# Patient Record
Sex: MALE | Race: WHITE
[De-identification: names, ages, dates, MRNs, and addresses within clinical notes are randomized per-mention and may not be internally consistent; named-entity substitution may affect disease eponyms.]

---

## 2017-08-09 ENCOUNTER — HOSPITAL ENCOUNTER (EMERGENCY)
Dept: HOSPITAL 17 - PHED | Age: 82
Discharge: HOME | End: 2017-08-09
Payer: MEDICARE

## 2017-08-09 VITALS
HEART RATE: 89 BPM | RESPIRATION RATE: 22 BRPM | SYSTOLIC BLOOD PRESSURE: 181 MMHG | DIASTOLIC BLOOD PRESSURE: 84 MMHG | OXYGEN SATURATION: 97 % | TEMPERATURE: 98.3 F

## 2017-08-09 VITALS — OXYGEN SATURATION: 100 % | RESPIRATION RATE: 22 BRPM | HEART RATE: 84 BPM

## 2017-08-09 VITALS — WEIGHT: 154.32 LBS | HEIGHT: 68 IN | BODY MASS INDEX: 23.39 KG/M2

## 2017-08-09 DIAGNOSIS — Z87.09: ICD-10-CM

## 2017-08-09 DIAGNOSIS — Z86.79: ICD-10-CM

## 2017-08-09 DIAGNOSIS — Z99.81: ICD-10-CM

## 2017-08-09 DIAGNOSIS — N19: ICD-10-CM

## 2017-08-09 DIAGNOSIS — J44.1: Primary | ICD-10-CM

## 2017-08-09 DIAGNOSIS — E78.00: ICD-10-CM

## 2017-08-09 DIAGNOSIS — Z87.19: ICD-10-CM

## 2017-08-09 DIAGNOSIS — Z86.69: ICD-10-CM

## 2017-08-09 DIAGNOSIS — I10: ICD-10-CM

## 2017-08-09 DIAGNOSIS — R94.31: ICD-10-CM

## 2017-08-09 LAB
ANION GAP SERPL CALC-SCNC: 11 MEQ/L (ref 5–15)
BASOPHILS # BLD AUTO: 0.1 TH/MM3 (ref 0–0.2)
BASOPHILS NFR BLD: 1 % (ref 0–2)
BUN SERPL-MCNC: 29 MG/DL (ref 7–18)
CHLORIDE SERPL-SCNC: 103 MEQ/L (ref 98–107)
EOSINOPHIL # BLD: 0.2 TH/MM3 (ref 0–0.4)
EOSINOPHIL NFR BLD: 3.1 % (ref 0–4)
ERYTHROCYTE [DISTWIDTH] IN BLOOD BY AUTOMATED COUNT: 13.9 % (ref 11.6–17.2)
GFR SERPLBLD BASED ON 1.73 SQ M-ARVRAT: 34 ML/MIN (ref 89–?)
HCO3 BLD-SCNC: 24.5 MEQ/L (ref 21–32)
HCT VFR BLD CALC: 40.9 % (ref 39–51)
HEMO FLAGS: (no result)
LYMPHOCYTES # BLD AUTO: 0.8 TH/MM3 (ref 1–4.8)
LYMPHOCYTES NFR BLD AUTO: 12.5 % (ref 9–44)
MCH RBC QN AUTO: 28.5 PG (ref 27–34)
MCHC RBC AUTO-ENTMCNC: 32 % (ref 32–36)
MCV RBC AUTO: 89.2 FL (ref 80–100)
MONOCYTES NFR BLD: 9.5 % (ref 0–8)
NEUTROPHILS # BLD AUTO: 4.9 TH/MM3 (ref 1.8–7.7)
NEUTROPHILS NFR BLD AUTO: 73.9 % (ref 16–70)
PLATELET # BLD: 140 TH/MM3 (ref 150–450)
POTASSIUM SERPL-SCNC: 4.4 MEQ/L (ref 3.5–5.1)
RBC # BLD AUTO: 4.59 MIL/MM3 (ref 4.5–5.9)
SCAN/DIFF: (no result)
SODIUM SERPL-SCNC: 138 MEQ/L (ref 136–145)
WBC # BLD AUTO: 6.6 TH/MM3 (ref 4–11)

## 2017-08-09 PROCEDURE — 94664 DEMO&/EVAL PT USE INHALER: CPT

## 2017-08-09 PROCEDURE — 83880 ASSAY OF NATRIURETIC PEPTIDE: CPT

## 2017-08-09 PROCEDURE — 96374 THER/PROPH/DIAG INJ IV PUSH: CPT

## 2017-08-09 PROCEDURE — 80048 BASIC METABOLIC PNL TOTAL CA: CPT

## 2017-08-09 PROCEDURE — 71010: CPT

## 2017-08-09 PROCEDURE — 99285 EMERGENCY DEPT VISIT HI MDM: CPT

## 2017-08-09 PROCEDURE — 94640 AIRWAY INHALATION TREATMENT: CPT

## 2017-08-09 PROCEDURE — 93005 ELECTROCARDIOGRAM TRACING: CPT

## 2017-08-09 PROCEDURE — 84484 ASSAY OF TROPONIN QUANT: CPT

## 2017-08-09 PROCEDURE — 85025 COMPLETE CBC W/AUTO DIFF WBC: CPT

## 2017-08-09 NOTE — RADRPT
EXAM DATE/TIME:  08/09/2017 10:10 

 

[facility] COMPARISON:     

CHEST SINGLE AP, March 25, 2016, 20:06.

 

                     

INDICATIONS :     

Short of breath.

                     

 

MEDICAL HISTORY :     

Chronic obstructive pulmonary disease.  Emphysema.  Carcinoma, lung.   Aortic aneurysm.   

 

SURGICAL HISTORY :     

Lobectomy.   

 

ENCOUNTER:     

Initial                                        

 

ACUITY:     

2 days      

 

PAIN SCORE:     

0/10

 

LOCATION:     

Bilateral chest 

 

FINDINGS:     

Course parenchymal changes persist in both lungs with moderate hyperinflation.  Abnormal soft tissue 
remains about the aortic arch stable in the interval.  Previous surgery is noted on the left.  There 
is no pneumothorax.  Mild hyperinflation is evident.

 

CONCLUSION:     

Evidence of previous surgery on left with mild hyperinflation.  There is no overt congestive failure.


 

 

 

 Rickie Lozano MD FACR on August 09, 2017 at 10:29           

Board Certified Radiologist.

 This report was verified electronically.

## 2017-08-09 NOTE — PD
HPI


Chief Complaint:  Shortness of breath


Time Seen by Provider:  09:43


Travel History


International Travel<30 days:  No


Contact w/Intl Traveler<30days:  No


Traveled to known affect area:  No





History of Present Illness


HPI


This 85-year-old male is complaining of shortness of breath.  He has a history 

of emphysema and is currently on DuoNeb and oxygen as needed.  He says that the 

last few days he's been coughing.  No loose cough and he has brought up some 

phlegm.  He feels like his rattling at night.  He is not aware of any fever.  He

's been taking some [place] without much relief.  He has a history of 

COPD.  He has no history of heart disease.





[facility]


Past Medical History


Asthma:  No


Atrial Fibrillation:  Yes


Anxiety:  No


Depression:  No


Cancer:  Yes (LUNG, KIDNEY)


Cardiovascular Problems:  Yes (AAA REPAIRED,   CURRENTLY MEASURES 5.7 CM)


High Cholesterol:  Yes


Chemotherapy:  No


COPD:  Yes


Diminished Hearing:  No


Endocrine:  No


Gastrointestinal Disorders:  Yes


GERD:  Yes


Hiatal Hernia:  No


Hypertension:  Yes


Immune Disorder:  No


Kidney Stones:  No


Musculoskeletal:  No


Neurologic:  No


Psychiatric:  No


Reproductive:  No


Respiratory:  Yes (COPD)


Immunizations Current:  Yes


Pneumonia:  Yes


Radiation Therapy:  No


Renal Failure:  Yes


Shingles:  Yes


Sleep Apnea:  No


Ulcer:  No





Past Surgical History


Abdominal Surgery:  Yes (ANEURYSM REPAIR)


Genitourinary Surgery:  Yes (RT NEPHRECTOMY>CA)


Thoracic Surgery:  Yes (RT UPPER LOBECTOMY)


Other Surgery:  Yes





Social History


Alcohol Use:  Yes (BEER TWICE/WK)


Tobacco Use:  No (QUIT IN 1995)


Substance Use:  No





Allergies-Medications


(Allergen,Severity, Reaction):  


Coded Allergies:  


     Morphine (Verified  Allergy, Severe, Hallucinations, 3/25/16)


Reported Meds & Prescriptions





Reported Meds & Active Scripts


Active


Reported


Oxygen (O2)  Device 2-5 Liter FACE.MASK CONTINUOUS


     Oxygen Concentrator Portable Gaseous


     2-5 L/min via NFace Mask Continuous


     For 99 months


Omeprazole 20 Mg Tab 20 Mg PO DAILY


Amlodipine (Amlodipine Besylate) 5 Mg Tab 5 Mg PO DAILY


Lisinopril 20 Mg Tab 20 Mg PO DAILY


Diltiazem CD 24  Mg Capcr 360 Mg PO DAILY


[Duoneb]     QID








Review of Systems


General / Constitutional:  No: Fever, Chills


Eyes:  No: Diploplia, Blurred Vision


HENT:  No: Headaches


Cardiovascular:  No: Chest Pain or Discomfort


Respiratory:  Positive: Cough, Shortness of Breath


Gastrointestinal:  No: Vomiting, Diarrhea


Genitourinary:  No: Urgency, Frequency


Musculoskeletal:  No: Myalgias


Skin:  No Rash


Neurologic:  No: Weakness, Syncope





Physical Exam


Narrative


Patient is in mild to moderate respiratory distress on arrival.  His O2 sat is 

98%.  Elderly male


SKIN: Focused skin assessment warm/dry.


HEAD: Atraumatic. Normocephalic. 


EYES: Pupils equal and round. No scleral icterus. No injection or drainage. 


ENT: No nasal bleeding or discharge.  Mucous membranes pink and moist.


NECK: Trachea midline. No JVD. 


CARDIOVASCULAR: Regular rate and rhythm.  No murmur appreciated.


RESPIRATORY: accessory muscle use.  Diminished breath sounds bilaterally


GASTROINTESTINAL: Abdomen soft, non-tender, nondistended. Hepatic and splenic 

margins not palpable. 


MUSCULOSKELETAL: No obvious deformities. No clubbing.  No cyanosis.  No edema. 


NEUROLOGICAL: Awake and alert. No obvious cranial nerve deficits.  Motor 

grossly within normal limits. Normal speech.


PSYCHIATRIC: Appropriate mood and affect; insight and judgment normal.





Data


Data


Last Documented VS





Vital Signs








  Date Time  Temp Pulse Resp B/P Pulse Ox O2 Delivery O2 Flow Rate FiO2


 


8/9/17 11:01  84 22  100 Room Air  


 


8/9/17 09:35 98.3   181/84    








Orders








 Electrocardiogram (8/9/17 09:49)


Complete Blood Count With Diff (8/9/17 09:49)


Basic Metabolic Panel (Bmp) (8/9/17 09:49)


Troponin I (8/9/17 09:49)


B-Type Natriuretic Peptide (8/9/17 09:49)


Urinalysis - C+S If Indicated (8/9/17 09:49)


Chest, Single Ap (8/9/17 09:49)


Albuterol-Ipratropium Neb (Duoneb Neb) (8/9/17 10:00)


Methylprednisolone So Succ Inj (Solumedr (8/9/17 10:00)








Labs








 Laboratory Tests








Test 8/9/17





 10:47


 


White Blood Count 6.6 TH/MM3


 


Red Blood Count 4.59 MIL/MM3


 


Hemoglobin 13.1 GM/DL


 


Hematocrit 40.9 %


 


Mean Corpuscular Volume 89.2 FL


 


Mean Corpuscular Hemoglobin 28.5 PG


 


Mean Corpuscular Hemoglobin 32.0 %





Concent 


 


Red Cell Distribution Width 13.9 %


 


Platelet Count 140 TH/MM3


 


Mean Platelet Volume 8.6 FL


 


Neutrophils (%) (Auto) 73.9 %


 


Lymphocytes (%) (Auto) 12.5 %


 


Monocytes (%) (Auto) 9.5 %


 


Eosinophils (%) (Auto) 3.1 %


 


Basophils (%) (Auto) 1.0 %


 


Neutrophils # (Auto) 4.9 TH/MM3


 


Lymphocytes # (Auto) 0.8 TH/MM3


 


Monocytes # (Auto) 0.6 TH/MM3


 


Eosinophils # (Auto) 0.2 TH/MM3


 


Basophils # (Auto) 0.1 TH/MM3


 


CBC Comment AUTO DIFF 


 


Sodium Level 138 MEQ/L


 


Potassium Level 4.4 MEQ/L


 


Chloride Level 103 MEQ/L


 


Carbon Dioxide Level 24.5 MEQ/L


 


Anion Gap 11 MEQ/L


 


Blood Urea Nitrogen 29 MG/DL


 


Creatinine 1.90 MG/DL


 


Estimat Glomerular Filtration 34 ML/MIN





Rate 


 


Random Glucose 97 MG/DL


 


Calcium Level 8.9 MG/DL


 


Troponin I LESS THAN 0.02





 NG/ML














[facility]


Medical Decision Making


Medical Screen Exam Complete:  Yes


Emergency Medical Condition:  Yes


Medical Record Reviewed:  Yes


Differential Diagnosis


Differential includes pneumonia, COPD exacerbation, CHF


Narrative Course


Patient has been given repeated DuoNeb nebs with some improvement.  Repeat exam 

shows better air entry.  Chest x-rays read as unchanged.  His white count is 

normal.  Impression is COPD exacerbation.  He has been given Solu-Medrol.  Will 

be released with prescriptions for amoxicillin and prednisone





Diagnosis





 Primary Impression:  


 COPD (chronic obstructive pulmonary disease)


Scripts


Prednisone 20 Mg Tab60 Mg PO DAILY  3 Days  Ref 0


   Prov:Luis Marie MD         8/9/17 


Amoxicillin 500 Mg Dha229 Mg PO TID  #30 CAP  Ref 0


   Prov:Luis Marie MD         8/9/17


Disposition:  01 DISCHARGE HOME


Condition:  Stable








Luis Marie MD Aug 9, 2017 09:52

## 2017-08-10 NOTE — EKG
Date Performed: 08/09/2017       Time Performed: 10:09:45

 

PTAGE:      85 years

 

EKG:      Sinus rhythm 

 

 POSSIBLE INFERIOR MYOCARDIAL INFARCTION BORDERLINE ECG

 

PREVIOUS TRACING       : 10/02/2015 20.19

 

DOCTOR:   Polo Colón  Interpretating Date/Time  08/10/2017 08:04:21

## 2017-08-15 ENCOUNTER — HOSPITAL ENCOUNTER (INPATIENT)
Dept: HOSPITAL 17 - PHED | Age: 82
LOS: 3 days | Discharge: HOME | DRG: 192 | End: 2017-08-18
Attending: HOSPITALIST | Admitting: HOSPITALIST
Payer: MEDICARE

## 2017-08-15 VITALS
RESPIRATION RATE: 24 BRPM | DIASTOLIC BLOOD PRESSURE: 90 MMHG | SYSTOLIC BLOOD PRESSURE: 163 MMHG | TEMPERATURE: 96.9 F | HEART RATE: 88 BPM | OXYGEN SATURATION: 98 %

## 2017-08-15 VITALS
RESPIRATION RATE: 18 BRPM | SYSTOLIC BLOOD PRESSURE: 198 MMHG | HEART RATE: 75 BPM | OXYGEN SATURATION: 100 % | DIASTOLIC BLOOD PRESSURE: 84 MMHG

## 2017-08-15 VITALS — WEIGHT: 154.54 LBS | BODY MASS INDEX: 23.42 KG/M2 | HEIGHT: 68 IN

## 2017-08-15 VITALS
SYSTOLIC BLOOD PRESSURE: 185 MMHG | DIASTOLIC BLOOD PRESSURE: 72 MMHG | OXYGEN SATURATION: 94 % | HEART RATE: 76 BPM | RESPIRATION RATE: 18 BRPM

## 2017-08-15 VITALS
OXYGEN SATURATION: 99 % | DIASTOLIC BLOOD PRESSURE: 77 MMHG | HEART RATE: 77 BPM | SYSTOLIC BLOOD PRESSURE: 186 MMHG | TEMPERATURE: 98 F | RESPIRATION RATE: 18 BRPM

## 2017-08-15 VITALS — RESPIRATION RATE: 16 BRPM | OXYGEN SATURATION: 99 %

## 2017-08-15 VITALS
HEART RATE: 66 BPM | OXYGEN SATURATION: 99 % | SYSTOLIC BLOOD PRESSURE: 186 MMHG | DIASTOLIC BLOOD PRESSURE: 87 MMHG | RESPIRATION RATE: 18 BRPM

## 2017-08-15 DIAGNOSIS — N18.3: ICD-10-CM

## 2017-08-15 DIAGNOSIS — Z92.3: ICD-10-CM

## 2017-08-15 DIAGNOSIS — J44.1: Primary | ICD-10-CM

## 2017-08-15 DIAGNOSIS — C44.42: ICD-10-CM

## 2017-08-15 DIAGNOSIS — I12.9: ICD-10-CM

## 2017-08-15 DIAGNOSIS — Z87.891: ICD-10-CM

## 2017-08-15 DIAGNOSIS — I48.0: ICD-10-CM

## 2017-08-15 DIAGNOSIS — Z85.528: ICD-10-CM

## 2017-08-15 DIAGNOSIS — Z90.5: ICD-10-CM

## 2017-08-15 DIAGNOSIS — Z99.81: ICD-10-CM

## 2017-08-15 DIAGNOSIS — Z85.118: ICD-10-CM

## 2017-08-15 DIAGNOSIS — Z90.2: ICD-10-CM

## 2017-08-15 DIAGNOSIS — K21.9: ICD-10-CM

## 2017-08-15 DIAGNOSIS — I71.4: ICD-10-CM

## 2017-08-15 DIAGNOSIS — E78.5: ICD-10-CM

## 2017-08-15 LAB
ALP SERPL-CCNC: 78 U/L (ref 45–117)
ALT SERPL-CCNC: 48 U/L (ref 12–78)
ANION GAP SERPL CALC-SCNC: 10 MEQ/L (ref 5–15)
APTT BLD: 23.7 SEC (ref 24.3–30.1)
AST SERPL-CCNC: 23 U/L (ref 15–37)
BASOPHILS # BLD AUTO: 0.1 TH/MM3 (ref 0–0.2)
BASOPHILS NFR BLD: 1.3 % (ref 0–2)
BILIRUB SERPL-MCNC: 0.5 MG/DL (ref 0.2–1)
BUN SERPL-MCNC: 33 MG/DL (ref 7–18)
CHLORIDE SERPL-SCNC: 103 MEQ/L (ref 98–107)
CK SERPL-CCNC: 75 U/L (ref 39–308)
EOSINOPHIL # BLD: 0.1 TH/MM3 (ref 0–0.4)
EOSINOPHIL NFR BLD: 1.6 % (ref 0–4)
ERYTHROCYTE [DISTWIDTH] IN BLOOD BY AUTOMATED COUNT: 13.8 % (ref 11.6–17.2)
GFR SERPLBLD BASED ON 1.73 SQ M-ARVRAT: 36 ML/MIN (ref 89–?)
HCO3 BLD-SCNC: 23.9 MEQ/L (ref 21–32)
HCT VFR BLD CALC: 38 % (ref 39–51)
HEMO FLAGS: (no result)
INR PPP: 1 RATIO
LYMPHOCYTES # BLD AUTO: 0.7 TH/MM3 (ref 1–4.8)
LYMPHOCYTES NFR BLD AUTO: 7.3 % (ref 9–44)
MCH RBC QN AUTO: 29 PG (ref 27–34)
MCHC RBC AUTO-ENTMCNC: 32.8 % (ref 32–36)
MCV RBC AUTO: 88.3 FL (ref 80–100)
MONOCYTES NFR BLD: 13 % (ref 0–8)
NEUTROPHILS # BLD AUTO: 7.2 TH/MM3 (ref 1.8–7.7)
NEUTROPHILS NFR BLD AUTO: 76.8 % (ref 16–70)
PLATELET # BLD: 148 TH/MM3 (ref 150–450)
POTASSIUM SERPL-SCNC: 4.3 MEQ/L (ref 3.5–5.1)
PROTHROMBIN TIME: 10.7 SEC (ref 9.8–11.6)
RBC # BLD AUTO: 4.31 MIL/MM3 (ref 4.5–5.9)
SODIUM SERPL-SCNC: 137 MEQ/L (ref 136–145)
WBC # BLD AUTO: 9.3 TH/MM3 (ref 4–11)

## 2017-08-15 PROCEDURE — A9540 TC99M MAA: HCPCS

## 2017-08-15 PROCEDURE — 85730 THROMBOPLASTIN TIME PARTIAL: CPT

## 2017-08-15 PROCEDURE — 85025 COMPLETE CBC W/AUTO DIFF WBC: CPT

## 2017-08-15 PROCEDURE — 85610 PROTHROMBIN TIME: CPT

## 2017-08-15 PROCEDURE — 96374 THER/PROPH/DIAG INJ IV PUSH: CPT

## 2017-08-15 PROCEDURE — 94640 AIRWAY INHALATION TREATMENT: CPT

## 2017-08-15 PROCEDURE — 84484 ASSAY OF TROPONIN QUANT: CPT

## 2017-08-15 PROCEDURE — 80053 COMPREHEN METABOLIC PANEL: CPT

## 2017-08-15 PROCEDURE — 83880 ASSAY OF NATRIURETIC PEPTIDE: CPT

## 2017-08-15 PROCEDURE — 94150 VITAL CAPACITY TEST: CPT

## 2017-08-15 PROCEDURE — 87804 INFLUENZA ASSAY W/OPTIC: CPT

## 2017-08-15 PROCEDURE — 87040 BLOOD CULTURE FOR BACTERIA: CPT

## 2017-08-15 PROCEDURE — 80048 BASIC METABOLIC PNL TOTAL CA: CPT

## 2017-08-15 PROCEDURE — 85027 COMPLETE CBC AUTOMATED: CPT

## 2017-08-15 PROCEDURE — 78582 LUNG VENTILAT&PERFUS IMAGING: CPT

## 2017-08-15 PROCEDURE — 93005 ELECTROCARDIOGRAM TRACING: CPT

## 2017-08-15 PROCEDURE — A9567 TECHNETIUM TC-99M AEROSOL: HCPCS

## 2017-08-15 PROCEDURE — 71010: CPT

## 2017-08-15 PROCEDURE — 82550 ASSAY OF CK (CPK): CPT

## 2017-08-15 PROCEDURE — 85379 FIBRIN DEGRADATION QUANT: CPT

## 2017-08-15 PROCEDURE — 93970 EXTREMITY STUDY: CPT

## 2017-08-15 PROCEDURE — 94664 DEMO&/EVAL PT USE INHALER: CPT

## 2017-08-15 RX ADMIN — Medication SCH ML: at 22:27

## 2017-08-15 RX ADMIN — HEPARIN SODIUM SCH MLS/HR: 10000 INJECTION, SOLUTION INTRAVENOUS at 22:28

## 2017-08-15 RX ADMIN — HEPARIN SODIUM SCH MLS/HR: 10000 INJECTION, SOLUTION INTRAVENOUS at 22:32

## 2017-08-15 NOTE — RADRPT
EXAM DATE/TIME:  08/15/2017 18:40 

 

[facility] COMPARISON:     

CHEST SINGLE AP, August 09, 2017, 10:10.

 

                     

INDICATIONS :     

Short of breath. 

                     

 

MEDICAL HISTORY :     

Chronic obstructive pulmonary disease.       Emphysema. Carcinoma, lung.   

 

SURGICAL HISTORY :        

Lobectomy.

 

ENCOUNTER:     

Initial                                        

 

ACUITY:     

1 week      

 

PAIN SCORE:     

0/10

 

LOCATION:     

Bilateral chest 

 

FINDINGS:     

Volume loss and scarring again seen on the left. No acute infiltrates seen. No large effusion. No per
ceptible pneumothorax.

 

Heart size stable, upper limits of normal. Tortuous thoracic aorta again noted.

 

CONCLUSION:     

Chronic and surgical changes as above. No acute abnormality demonstrated.

 

 

 

 Javier Ojeda MD on August 15, 2017 at 18:49           

Board Certified Radiologist.

 This report was verified electronically.

## 2017-08-15 NOTE — PD
HPI


Chief Complaint:  Respiratory Distress


Time Seen by Provider:  17:49


Travel History


International Travel<30 days:  No


Contact w/Intl Traveler<30days:  No


Traveled to known affect area:  No





History of Present Illness


HPI


85-year-old male complains of congestion and shortness of breath.  Patient 

states that the symptoms started about a week ago.  Patient has history of COPD 

on home O2.  Patient use oxygen at home 1/2 L as needed.  Patient was seen in 

emergency room 6 days ago with diagnosis of acute exacerbation of COPD.  

Patient was given Solu-Medrol IV and DuoNeb treatment.  Patient was discharged 

home with prescription for prednisone and amoxicillin.  Patient states that he 

has increasing shortness of breath for the past 2 days.  Patient states that he 

has productive cough.  Patient denies any headache.  Patient states that he had 

chest discomfort, tightness intermittently.  Patient states that he has 

increased bilateral ankle swelling recently.  Patient denies any abdominal 

pain.  Patient denies any nausea vomiting diarrhea.  Patient has history of 

hypertension, hyperlipidemia, abdominal aortic aneurysm, Not candidate for 

surgery, chronic kidney disease, status post left nephrectomy secondary to 

cancer, status post right upper lobe lobectomy secondary to cancer.  Patient 

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 

radiation therapy.





[facility]


Past Medical History


Asthma:  No


Atrial Fibrillation:  Yes


Anxiety:  No


Depression:  No


Cancer:  Yes (LUNG, KIDNEY)


Cardiovascular Problems:  Yes (AAA REPAIRED,   CURRENTLY MEASURES 5.7 CM)


High Cholesterol:  Yes


Chemotherapy:  No


COPD:  Yes


Diminished Hearing:  No


Endocrine:  No


Gastrointestinal Disorders:  Yes


GERD:  Yes


Hiatal Hernia:  No


Hypertension:  Yes


Immune Disorder:  No


Implanted Vascular Access Dvce:  No


Kidney Stones:  No


Musculoskeletal:  No


Neurologic:  No


Psychiatric:  No


Reproductive:  No


Respiratory:  Yes (COPD)


Immunizations Current:  Yes


Pneumonia:  Yes


Radiation Therapy:  No


Renal Failure:  Yes


Shingles:  Yes


Sleep Apnea:  No


Ulcer:  No





Past Surgical History


Abdominal Surgery:  Yes (ANEURYSM REPAIR)


Genitourinary Surgery:  Yes (RT NEPHRECTOMY>CA)


Thoracic Surgery:  Yes (RT UPPER LOBECTOMY)


Other Surgery:  Yes





Social History


Alcohol Use:  Yes (BEER TWICE/WK)


Tobacco Use:  No (QUIT IN 1995)


Substance Use:  No





Allergies-Medications


(Allergen,Severity, Reaction):  


Coded Allergies:  


     morphine (Unverified  Allergy, Severe, Hallucinations, 8/15/17)


Reported Meds & Prescriptions





Reported Meds & Active Scripts


Active


Prednisone 20 Mg Tab 60 Mg PO DAILY 3 Days


Amoxicillin 500 Mg Cap 500 Mg PO TID


Reported


Oxygen (O2)  Device 2-5 Liter FACE.MASK CONTINUOUS


     Oxygen Concentrator Portable Gaseous


     2-5 L/min via NFace Mask Continuous


     For 99 months


Omeprazole 20 Mg Tab 20 Mg PO DAILY


Amlodipine (Amlodipine Besylate) 5 Mg Tab 5 Mg PO DAILY


Lisinopril 20 Mg Tab 20 Mg PO DAILY


Diltiazem CD 24  Mg Capcr 360 Mg PO DAILY


[Duoneb]     QID








Review of Systems


General / Constitutional:  No: Fever


Eyes:  No: Visual changes


HENT:  No: Headaches


Cardiovascular:  No: Chest Pain or Discomfort


Respiratory:  Positive: Shortness of Breath


Gastrointestinal:  No: Abdominal Pain


Genitourinary:  No: Dysuria


Musculoskeletal:  No: Pain


Skin:  No Rash


Neurologic:  No: Weakness


Psychiatric:  No: Depression


Endocrine:  No: Polydipsia


Hematologic/Lymphatic:  No: Easy Bruising





Physical Exam


Narrative


GENERAL: Well-nourished, well-developed patient.


SKIN: Focused skin assessment warm/dry.


HEAD: Normocephalic.


EYES: No scleral icterus. No injection or drainage. 


NECK: Supple, trachea midline. No JVD or lymphadenopathy.


CARDIOVASCULAR: Regular rate and rhythm without murmurs, gallops, or rubs. 


RESPIRATORY: Breath sounds equal bilaterally. No accessory muscle use.  Mild 

expiratory wheezes bilaterally.


GASTROINTESTINAL: Abdomen soft, non-tender, nondistended. 


MUSCULOSKELETAL: No cyanosis, or edema. 


BACK: Nontender without obvious deformity. No CVA tenderness. 


Neurologic exam normal.





MDM


Medical Decision Making


Medical Screen Exam Complete:  Yes


Emergency Medical Condition:  Yes


Differential Diagnosis


Differential diagnosis including acute exacerbation COPD, bronchitis, pneumonia

, PE, pneumothorax.


Narrative Course


85-year-old male with increasing shortness of breath.  History of COPD.  

Patient has history of lung and kidney cancer status post surgery in the past.  

Albuterol Atrovent unit dose treatment times one.  Solu-Medrol 125 mg IV.








Bharathi Hartman MD Aug 15, 2017 18:12

## 2017-08-15 NOTE — RADRPT
EXAM DATE/TIME:  08/15/2017 19:41 

 

[facility] COMPARISON:     

CHEST SINGLE AP, August 15, 2017, 18:40.

 

 

INDICATIONS :      

Short of breath for 1 week.

                       

 

DOSE:      

8.1 mCi Tc99m MAA IV 

                                           1.1 mCi Tc99m DTPA aerosol 

                       

                       

 

MEDICAL HISTORY :     

Chronic obstructive pulmonary disease. Carcinoma, basal cell.  Adrenal gland cancer.

 

SURGICAL HISTORY :          

Right kidney removed, right upper lobectomy and hernia repair.

 

ENCOUNTER:     

Initial

 

ACUITY:     

1 week

 

PAIN SCALE:     

3/10

 

LOCATION:      

Bilateral chest 

 

TECHNIQUE:     

Following five minutes of tidal breathing of DTPA aerosol, planar images of the lungs were performed 
in eight projections.  The patient was then injected with MAA, and eight-view perfusion scan was perf
ormed.  

 

FINDINGS:     

Focal perfusion deficits seen medially at the right lung apex and at the right base.

 

Reasonably homogeneous perfusion of the left.

 

 

CONCLUSION:     

Intermediate probability for pulmonary embolus with 2 subsegmental perfusion deficits of the right hodan
ng noted.

 

 

 

 Javier Ojeda MD on August 15, 2017 at 20:09           

Board Certified Radiologist.

 This report was verified electronically.

## 2017-08-15 NOTE — PD
Physical Exam


Date Seen by Provider:  Aug 15, 2017


Time Seen by Provider:  19:10


Narrative


Accepted in transfer of care from Dr. Hartman


GENERAL: Well-developed well-nourished male in no acute distress appears in 

mild respiratory distress with O2 saturation 100% on 2 L/m nasal cannula oxygen


SKIN: Warm and dry.


HEAD: Normocephalic.


EYES: No scleral icterus. No injection or drainage. 


NECK: Supple, trachea midline. No JVD or lymphadenopathy.


CARDIOVASCULAR: Regular rate and rhythm without murmurs, gallops, or rubs. 


RESPIRATORY: Breath sounds equal bilaterally mildly depressed without wheezing 

to auscultation. No accessory muscle use.


GASTROINTESTINAL: Abdomen soft, non-tender, nondistended. 


MUSCULOSKELETAL: No cyanosis, or edema. 


BACK: Nontender without obvious deformity. No CVA tenderness.








Data


Data


Last Documented VS





Vital Signs








  Date Time  Temp Pulse Resp B/P Pulse Ox O2 Delivery O2 Flow Rate FiO2


 


8/15/17 18:36  66 18 186/87 99 Room Air  


 


8/15/17 18:00       2 


 


8/15/17 17:35 98.0       








Orders





 Complete Blood Count With Diff (8/15/17 17:55)


Comprehensive Metabolic Panel (8/15/17 17:55)


B-Type Natriuretic Peptide (8/15/17 17:55)


D-Dimer (8/15/17 17:55)


Act Partial Throm Time (Ptt) (8/15/17 17:55)


Prothrombin Time / Inr (Pt) (8/15/17 17:55)


Ckmb (Isoenzyme) Profile (8/15/17 17:55)


Troponin I (8/15/17 17:55)


Influenzae A/B Antigen (8/15/17 17:55)


Blood Culture (8/15/17 17:55)


Iv Access Insert/Monitor (8/15/17 17:55)


Electrocardiogram (8/15/17 17:55)


Ecg Monitoring (8/15/17 17:55)


Oximetry (8/15/17 17:55)


Oxygen Administration (8/15/17 17:55)


Chest, Single Ap (8/15/17 17:55)


Ventilation & Perfusion Scan (8/15/17 17:55)


Sodium Chloride 0.9% Flush (Ns Flush) (8/15/17 18:00)


Methylprednisolone So Succ Inj (Solumedr (8/15/17 18:00)


Albuterol-Ipratropium Neb (Duoneb Neb) (8/15/17 18:00)





Labs





 Laboratory Tests








Test 8/15/17





 18:20


 


White Blood Count 9.3 TH/MM3


 


Red Blood Count 4.31 MIL/MM3


 


Hemoglobin 12.5 GM/DL


 


Hematocrit 38.0 %


 


Mean Corpuscular Volume 88.3 FL


 


Mean Corpuscular Hemoglobin 29.0 PG


 


Mean Corpuscular Hemoglobin 32.8 %





Concent 


 


Red Cell Distribution Width 13.8 %


 


Platelet Count 148 TH/MM3


 


Mean Platelet Volume 8.2 FL


 


Neutrophils (%) (Auto) 76.8 %


 


Lymphocytes (%) (Auto) 7.3 %


 


Monocytes (%) (Auto) 13.0 %


 


Eosinophils (%) (Auto) 1.6 %


 


Basophils (%) (Auto) 1.3 %


 


Neutrophils # (Auto) 7.2 TH/MM3


 


Lymphocytes # (Auto) 0.7 TH/MM3


 


Monocytes # (Auto) 1.2 TH/MM3


 


Eosinophils # (Auto) 0.1 TH/MM3


 


Basophils # (Auto) 0.1 TH/MM3


 


CBC Comment DIFF FINAL 


 


Differential Comment  


 


Prothrombin Time 10.7 SEC


 


Prothromb Time International 1.0 RATIO





Ratio 


 


Activated Partial 23.7 SEC





Thromboplast Time 


 


D-Dimer Quantitative (PE/DVT) 7.98 MG/L FEU


 


Sodium Level 137 MEQ/L


 


Potassium Level 4.3 MEQ/L


 


Chloride Level 103 MEQ/L


 


Carbon Dioxide Level 23.9 MEQ/L


 


Anion Gap 10 MEQ/L


 


Blood Urea Nitrogen 33 MG/DL


 


Creatinine 1.80 MG/DL


 


Estimat Glomerular Filtration 36 ML/MIN





Rate 


 


Random Glucose 98 MG/DL


 


Calcium Level 7.7 MG/DL


 


Total Bilirubin 0.5 MG/DL


 


Aspartate Amino Transf 23 U/L





(AST/SGOT) 


 


Alanine Aminotransferase 48 U/L





(ALT/SGPT) 


 


Alkaline Phosphatase 78 U/L


 


Total Creatine Kinase 75 U/L


 


Troponin I LESS THAN 0.02





 NG/ML


 


B-Type Natriuretic Peptide 53 PG/ML


 


Total Protein 5.8 GM/DL


 


Albumin 3.0 GM/DL











[facility]


Medical Record Reviewed:  Yes


Supervised Visit with PORFIRIO:  No


Interpretation(s)





Last Impressions








Lung Scan-[facility] 8/15/17 8214 Signed





Impressions: 





 Service Date/Time:  Tuesday, August 15, 2017 19:41 - CONCLUSION:  Intermediate 





 probability for pulmonary embolus with 2 subsegmental perfusion deficits of 

the 





 right lung noted.     Javier Ojeda MD 


 


Chest X-Ray 8/15/17 [id number] Signed





Impressions: 





 Service Date/Time:  Tuesday, August 15, 2017 18:40 - CONCLUSION:  Chronic and 





 surgical changes as above. No acute abnormality demonstrated.     Javier Ojeda MD 





CBC & BMP Diagram


8/15/17 18:20











 Vital Signs








  Date Time  Temp Pulse Resp B/P Pulse Ox O2 Delivery O2 Flow Rate FiO2


 


8/15/17 18:36  66 18 186/87 99 Room Air  


 


8/15/17 18:00     100 Nasal Cannula 2 


 


8/15/17 17:45   16  99 Room Air  


 


8/15/17 17:41  66 22  99 Room Air  


 


8/15/17 17:35 98.0 77 18 186/77 99   








Differential Diagnosis


Accepted in transfer of care from Dr. Hartman; please refer to his dictation


Narrative Course


Accepted in transfer of care from Dr. Hartman; follow up pending diagnostics and 

disposition


Physician Communication


Physician Communication


discussed with Dr Feng --admit for intermediate VQ prob for PE and exacerbation 

COPD, HTN





Diagnosis





 Primary Impression:  


 Dyspnea


 Additional Impressions:  


 COPD (chronic obstructive pulmonary disease)


 PE (pulmonary thromboembolism)





Admitting Information


Admitting Physician Requests:  Admit








Domitila Rojas MD Aug 15, 2017 19:27

## 2017-08-16 VITALS
HEART RATE: 91 BPM | TEMPERATURE: 97.1 F | OXYGEN SATURATION: 98 % | DIASTOLIC BLOOD PRESSURE: 67 MMHG | SYSTOLIC BLOOD PRESSURE: 144 MMHG | RESPIRATION RATE: 18 BRPM

## 2017-08-16 VITALS
TEMPERATURE: 98.4 F | OXYGEN SATURATION: 97 % | SYSTOLIC BLOOD PRESSURE: 180 MMHG | RESPIRATION RATE: 16 BRPM | HEART RATE: 74 BPM | DIASTOLIC BLOOD PRESSURE: 80 MMHG

## 2017-08-16 VITALS
OXYGEN SATURATION: 100 % | DIASTOLIC BLOOD PRESSURE: 88 MMHG | SYSTOLIC BLOOD PRESSURE: 152 MMHG | HEART RATE: 81 BPM | RESPIRATION RATE: 23 BRPM | TEMPERATURE: 96.5 F

## 2017-08-16 VITALS
DIASTOLIC BLOOD PRESSURE: 88 MMHG | HEART RATE: 74 BPM | OXYGEN SATURATION: 96 % | RESPIRATION RATE: 20 BRPM | TEMPERATURE: 98.5 F | SYSTOLIC BLOOD PRESSURE: 152 MMHG

## 2017-08-16 VITALS — DIASTOLIC BLOOD PRESSURE: 70 MMHG | HEART RATE: 70 BPM | SYSTOLIC BLOOD PRESSURE: 160 MMHG

## 2017-08-16 VITALS — OXYGEN SATURATION: 98 %

## 2017-08-16 VITALS
OXYGEN SATURATION: 98 % | DIASTOLIC BLOOD PRESSURE: 79 MMHG | TEMPERATURE: 98.9 F | SYSTOLIC BLOOD PRESSURE: 180 MMHG | RESPIRATION RATE: 18 BRPM | HEART RATE: 70 BPM

## 2017-08-16 VITALS — OXYGEN SATURATION: 100 %

## 2017-08-16 VITALS — HEART RATE: 84 BPM

## 2017-08-16 VITALS — OXYGEN SATURATION: 95 %

## 2017-08-16 LAB
ANION GAP SERPL CALC-SCNC: 10 MEQ/L (ref 5–15)
APTT BLD: (no result) SEC (ref 24.3–30.1)
APTT BLD: (no result) SEC (ref 24.3–30.1)
APTT BLD: 48.8 SEC (ref 24.3–30.1)
APTT BLD: 61.1 SEC (ref 24.3–30.1)
APTT BLD: 69.6 SEC (ref 24.3–30.1)
BASOPHILS # BLD AUTO: 0 TH/MM3 (ref 0–0.2)
BASOPHILS NFR BLD: 0 % (ref 0–2)
BUN SERPL-MCNC: 35 MG/DL (ref 7–18)
CHLORIDE SERPL-SCNC: 103 MEQ/L (ref 98–107)
EOSINOPHIL # BLD: 0 TH/MM3 (ref 0–0.4)
EOSINOPHIL NFR BLD: 0.1 % (ref 0–4)
ERYTHROCYTE [DISTWIDTH] IN BLOOD BY AUTOMATED COUNT: 13.7 % (ref 11.6–17.2)
GFR SERPLBLD BASED ON 1.73 SQ M-ARVRAT: 36 ML/MIN (ref 89–?)
HCO3 BLD-SCNC: 23 MEQ/L (ref 21–32)
HCT VFR BLD CALC: 39.5 % (ref 39–51)
HEMO FLAGS: (no result)
LYMPHOCYTES # BLD AUTO: 0.3 TH/MM3 (ref 1–4.8)
LYMPHOCYTES NFR BLD AUTO: 3.4 % (ref 9–44)
MCH RBC QN AUTO: 29.9 PG (ref 27–34)
MCHC RBC AUTO-ENTMCNC: 33.9 % (ref 32–36)
MCV RBC AUTO: 88.2 FL (ref 80–100)
MONOCYTES NFR BLD: 1 % (ref 0–8)
NEUTROPHILS # BLD AUTO: 9.7 TH/MM3 (ref 1.8–7.7)
NEUTROPHILS NFR BLD AUTO: 95.5 % (ref 16–70)
PLATELET # BLD: 157 TH/MM3 (ref 150–450)
POTASSIUM SERPL-SCNC: 4.6 MEQ/L (ref 3.5–5.1)
RBC # BLD AUTO: 4.49 MIL/MM3 (ref 4.5–5.9)
SODIUM SERPL-SCNC: 136 MEQ/L (ref 136–145)
WBC # BLD AUTO: 10.1 TH/MM3 (ref 4–11)

## 2017-08-16 RX ADMIN — HEPARIN SODIUM SCH MLS/HR: 10000 INJECTION, SOLUTION INTRAVENOUS at 22:47

## 2017-08-16 RX ADMIN — BUDESONIDE AND FORMOTEROL FUMARATE DIHYDRATE SCH PUFF: 160; 4.5 AEROSOL RESPIRATORY (INHALATION) at 22:41

## 2017-08-16 RX ADMIN — PANTOPRAZOLE SODIUM SCH MG: 20 TABLET, DELAYED RELEASE ORAL at 14:31

## 2017-08-16 RX ADMIN — IPRATROPIUM BROMIDE AND ALBUTEROL SULFATE SCH AMPULE: .5; 3 SOLUTION RESPIRATORY (INHALATION) at 15:42

## 2017-08-16 RX ADMIN — IPRATROPIUM BROMIDE AND ALBUTEROL SULFATE SCH AMPULE: .5; 3 SOLUTION RESPIRATORY (INHALATION) at 20:12

## 2017-08-16 RX ADMIN — IPRATROPIUM BROMIDE AND ALBUTEROL SULFATE SCH AMPULE: .5; 3 SOLUTION RESPIRATORY (INHALATION) at 08:03

## 2017-08-16 RX ADMIN — IPRATROPIUM BROMIDE AND ALBUTEROL SULFATE SCH AMPULE: .5; 3 SOLUTION RESPIRATORY (INHALATION) at 11:11

## 2017-08-16 RX ADMIN — Medication SCH ML: at 20:40

## 2017-08-16 RX ADMIN — DILTIAZEM HYDROCHLORIDE SCH MG: 180 CAPSULE, EXTENDED RELEASE ORAL at 14:31

## 2017-08-16 RX ADMIN — Medication SCH ML: at 09:00

## 2017-08-16 NOTE — MB
cc:

RAS SARGENT

****

 

 

DATE OF CONSULTATION

8/16/2017

 

HISTORY OF THE PRESENT ILLNESS

Mr. Parra is an 85-year-old white male who presented with dyspnea.  He has a

very extensive prior history including significant COPD for which he has a

nebulizer at home and uses every 4 hours.  He has been followed by Dr. Stokes

as an outpatient but he does not come to this hospital.

 

He had been seen several days prior to this admission as an outpatient in the

emergency room, was treated for an exacerbation of COPD but was not getting

better so returned.  He was admitted, placed on IV Solu-Medrol and aerosol

therapy and had a chest x-ray which revealed chronic postoperative changes.  He

has had a previous right upper lobectomy in the 1990s for a metastatic renal

cell lesion.  He had his kidney removed in 1995 for renal cell carcinoma.  He

also had a pulmonary embolism after that procedure.

 

In any event he then had ventilation perfusion lung scan because he had become

rather acutely short of breath and that was intermediate probability for

pulmonary embolism with two subsegmental perfusion defects noted in the right

lung.  Radiologist was consulted and although he has had previous surgery and

he does have underlying COPD.  This is clearly a questionable study, pulmonary

embolism really cannot be ruled in or out.  A CTA was considered but the

patient has marginal renal function in the remaining kidney with a BUN of 35

and creatinine of 1.8, a GFR estimated of 36.  When posed with the question of

whether to proceed with a CTA the patient refused due to the risk of renal

compromise.

 

The patient has been placed on heparin in light of these findings.

 

The record indicates that he has a history of atrial fibrillation but that is

not at all clear.  In 2012 he was hospitalized for shortness of breath, stayed

in the hospital several days.  Apparently they found atrial fibrillation,

although we do not have that documentation. He was treated with warfarin at

that time but had hemorrhaging from his urinary tract and bowels and the

warfarin was discontinued.  He has never been on it again and he is followed

with Dr. Perez here locally from a cardiac standpoint and he has never been

told that he had atrial fibrillation here.  His EKG on this admission reveals a

sinus rhythm with occasional ectopic beats.

 

The patient is feeling better.  He has been treated for an exacerbation which

has certainly helped but also as I mentioned heparin.

 

PAST MEDICAL HISTORY

Additional past history:

1. He had a thoracic aortic aneurysm repaired years ago.  He now has an

   abdominal aortic aneurysm but is not felt to be a candidate for repair due

   to his age and multiple comorbidities.  He has been seen locally as well as

   at [facility].

2. He has had multiple skin cancers treated.

3. Hypertension.

4. Hyperlipidemia.

 

SOCIAL HISTORY

 living with his wife of 40 years.  Drinks a couple of beers a week.

Smoked pretty heavily for 30-40 years but quit smoking in the mid 1990s.

 

REVIEW OF SYSTEMS

He has had no chest pain.  No hemoptysis.  He has had cough but no purulent

sputum. Shortness of breath is better.  No abdominal complaints.  No bleeding

on heparin here in the hospital.  No lower extremity edema on a chronic basis.

Dopplers of his legs during this admission were negative.

 

PHYSICAL EXAMINATION

GENERAL: Elderly white male no distress, comfortable at rest.

VITAL SIGNS: Saturation on 1.5 liters is %.  He is afebrile.  His pulse

is 90. Some irregularity. Blood pressure is 140/70.

HEENT: Sclerae anicteric.  Mucous membranes are moist.

NECK: Veins are flat.

CHEST: Diminished but entirely clear.  No wheezes, rales or congestion.  No

harsh murmur.  No audible S3.

ABDOMEN: Soft.  No peripheral edema or calf tenderness.  No cyanosis or

clubbing.

 

LABORATORY DATA

White counts 10,000, hemoglobin is 13, platelet counts 157.

 

D-dimer was 7.98 on presentation.

 

PT/INR normal.

 

Blood cultures were negative.

 

A nasal wash was negative for influenza.

 

DISCUSSION

Mr. Parra presents with what certainly could be accounted for on the basis

of an exacerbation of COPD but he has had a nuclear medicine ventilation

perfusion scan which is intermediate probability although with his underlying

lung disease and previous surgery it is probably really indeterminate.

 

I have thoroughly reviewed this with the patient and his wife and explained to

them that without a CTA we really are not going to be able to determine whether

he has had an embolism or not.  He has had a previous embolism which increases

his risk somewhat. He  has significant COPD and other risk factor. He is fairly

active but immobility could also be an issue, but again we simply are not going

to be able to determine that without further diagnostic testing which he

refuses due to the risk of renal compromise.

 

He had some hemorrhaging apparently in 2012 while on warfarin,  that has not

recurred here while on heparin but again that would be a concern in terms of

longer term anticoagulation.

 

After thoroughly reviewing this with he and his spouse he would prefer to use

anticoagulation at least in the short-term to make sure things are stable and

then he will follow up with Dr. Stokes and Dr. Perez to determine whether or

not this should be continued and for how long.

 

In the meantime he is being treated appropriately for the exacerbation of his

COPD.  He could be switched to oral therapy and discharged when stable in that

regard.

 

He understands that if he were have any bleeding he would need to come back to

the hospital immediately, and again I have emphasized to him the importance of

reviewing this with his cardiologist and pulmonologist as soon as he gets out

of the hospital to make additional plans going forward.

 

Since he has appropriate pulmonary follow-up with his regular pulmonologist,

Dr. Stokes I will not see him routinely at this point.

 

Thank you for asking me to see him with you in consultation during this

hospitalization.

 

 

 

                              _________________________________

                              R. Rickie Sargent MD

 

 

 

RSSALMA/KK

D:  8/16/2017/6:32 PM

T:  8/16/2017/9:36 PM

Visit #:  [id number]

Job #:  [id number]

## 2017-08-16 NOTE — HHI.HP
__________________________________________________





hospitals


Service


[facility]


Primary Care Physician


Corrine Milligan MD


Admission Diagnosis


Dyspnea w/ntermediate probability PE, exac copd, HTN


Diagnoses:  


Chief Complaint:  


Shortness of breath


Travel History


International Travel<30 Days:  No


Contact w/Intl Traveler <30 Da:  No


Traveled to Known Affected Are:  No


History of Present Illness


85-year-old male with a medical history significant for lung cancer status post 

resection, COPD, aortic aneurysm status post repair, chronic kidney disease, AAA

, history of nephrectomy secondary to renal cancer.  The patient presented to 

the hospital with complaints of worsening shortness of breath over the past 

week.  He presented to the ER a few days ago and was sent home with amoxicillin 

and prednisone.  He reports after taking amoxicillin for a couple of days, he 

believe he made him feel worse.  He has had increasing shortness of breath 

yesterday which prompted the emergency room visit.  He reports a productive 

cough.  He reports chronic intermittent bilateral ankle swelling.





Past Family Social History


Past Medical History


COPD Status post right upper lobe lobectomy secondary to cancer


Hypertension


Hyperlipidemia


History of aortic aneurysm status post repair


Abdominal aortic aneurysm, Not candidate for surgery


Chronic kidney disease, status post left nephrectomy secondary to cancer


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 

post radiation therapy.


Atrial fibrillation


Past Surgical History





Past Surgical History


Abdominal Surgery:  Yes (ANEURYSM REPAIR)


Genitourinary Surgery:  Yes (RT NEPHRECTOMY>CA)


Thoracic Surgery:  Yes (RT UPPER LOBECTOMY)


Other Surgery:  Yes





Social History


Alcohol Use:  Yes (BEER TWICE/WK)


Tobacco Use:  No (QUIT IN 1995)


Substance Use:  No


Reported Medications





Reported Meds & Active Scripts


Active


Amoxicillin 500 Mg Cap 500 Mg PO TID


Reported


Duoneb (Ipratropium-Albuterol Neb) 0.5-2.5 Mg/3 Ml Neb 1 Nebule INH Q4HR NEB PRN


Aspirin EC (Aspirin) 81 Mg Tabdr 81 Mg PO DAILY


Oxygen (O2)  Device 2-5 Liter FACE.MASK CONTINUOUS


     Oxygen Concentrator Portable Gaseous


     2-5 L/min via NFace Mask Continuous


     For 99 months


Omeprazole 20 Mg Tab 20 Mg PO DAILY


Lisinopril 20 Mg Tab 20 Mg PO DAILY


Diltiazem CD 24  Mg Capcr 360 Mg PO DAILY


Allergies:  


Coded Allergies:  


     morphine (Unverified  Allergy, Severe, Hallucinations, 8/15/17)


Family History


Reviewed and noncontributory.


Social History


Patient quit using tobacco back in 1995


Admits to a drinking a couple of beers every week


No illicit drugs.





Physical Exam


Vital Signs





 Vital Signs








  Date Time  Temp Pulse Resp B/P Pulse Ox O2 Delivery O2 Flow Rate FiO2


 


8/16/17 09:07  84      


 


8/16/17 08:08     100 Nasal Cannula 1.50 


 


8/16/17 08:00      Nasal Cannula 1.50 


 


8/16/17 04:00 96.5 81 23 152/88 100   


 


8/16/17 01:50     98 Nasal Cannula 1.50 


 


8/15/17 23:41     98 Nasal Cannula 1.50 


 


8/15/17 23:30  74      


 


8/15/17 23:30 96.9 88 24 163/90 98   


 


8/15/17 21:44  76 18 185/72 94 Nasal Cannula 1.5 


 


8/15/17 20:37  75 18 198/101 100 Nasal Cannula 1.5 





    218/84    


 


8/15/17 18:36  66 18 186/87 99 Room Air  


 


8/15/17 18:00     100 Nasal Cannula 2 


 


8/15/17 17:45   16  99 Room Air  


 


8/15/17 17:41  66 22  99 Room Air  


 


8/15/17 17:35 98.0 77 18 186/77 99   








Physical Exam


GENERAL: Chronically ill-appearing male, short of breath but can hold a 

conversation.


SKIN: No rashes, ecchymoses or lesions. Cool and dry.


HEAD: Atraumatic. Normocephalic. No temporal or scalp tenderness.


EYES: Pupils equal round and reactive. Extraocular motions intact. No scleral 

icterus. No injection or drainage. 


ENT: Nose without bleeding, purulent drainage or septal hematoma. Throat 

without erythema, tonsillar hypertrophy or exudate. Uvula midline. Airway 

patent.


NECK: Trachea midline. No JVD or lymphadenopathy. Supple, nontender, no 

meningeal signs.


CARDIOVASCULAR: Regular rate and rhythm without murmurs, gallops, or rubs. 


RESPIRATORY: Diminished breath sounds throughout.  End expiratory wheeze 

bilaterally.


GASTROINTESTINAL: Abdomen soft, non-tender, nondistended. No hepato-splenomegaly

, or palpable masses. No guarding.


MUSCULOSKELETAL: Extremities without clubbing, cyanosis, or edema. No joint 

tenderness, effusion, or edema noted. No calf tenderness. Negative Homans sign 

bilaterally.


NEUROLOGICAL: Awake and alert. Cranial nerves II through XII intact.  Motor and 

sensory grossly within normal limits. Five out of 5 muscle strength in all 

muscle groups.  Normal speech.


Laboratory





Laboratory Tests








Test 8/15/17 8/16/17 8/16/17 8/16/17





 18:20 04:00 04:53 09:10


 


White Blood Count 9.3   10.1  


 


Red Blood Count 4.31   4.49  


 


Hemoglobin 12.5   13.4  


 


Hematocrit 38.0   39.5  


 


Mean Corpuscular Volume 88.3   88.2  


 


Mean Corpuscular Hemoglobin 29.0   29.9  


 


Mean Corpuscular Hemoglobin 32.8   33.9  





Concent    


 


Red Cell Distribution Width 13.8   13.7  


 


Platelet Count 148   157  


 


Mean Platelet Volume 8.2   8.4  


 


Neutrophils (%) (Auto) 76.8   95.5  


 


Lymphocytes (%) (Auto) 7.3   3.4  


 


Monocytes (%) (Auto) 13.0   1.0  


 


Eosinophils (%) (Auto) 1.6   0.1  


 


Basophils (%) (Auto) 1.3   0.0  


 


Neutrophils # (Auto) 7.2   9.7  


 


Lymphocytes # (Auto) 0.7   0.3  


 


Monocytes # (Auto) 1.2   0.1  


 


Eosinophils # (Auto) 0.1   0.0  


 


Basophils # (Auto) 0.1   0.0  


 


CBC Comment DIFF FINAL   DIFF FINAL  


 


Differential Comment      


 


Prothrombin Time 10.7    


 


Prothromb Time International 1.0    





Ratio    


 


Activated Partial 23.7  GREATER THAN GREATER THAN 48.8 





Thromboplast Time  153.4 153.0 


 


D-Dimer Quantitative (PE/DVT) 7.98    


 


Sodium Level 137   136  


 


Potassium Level 4.3   4.6  


 


Chloride Level 103   103  


 


Carbon Dioxide Level 23.9   23.0  


 


Anion Gap 10   10  


 


Blood Urea Nitrogen 33   35  


 


Creatinine 1.80   1.80  


 


Estimat Glomerular Filtration 36   36  





Rate    


 


Random Glucose 98   265  


 


Calcium Level 7.7   8.0  


 


Total Bilirubin 0.5    


 


Aspartate Amino Transf 23    





(AST/SGOT)    


 


Alanine Aminotransferase 48    





(ALT/SGPT)    


 


Alkaline Phosphatase 78    


 


Total Creatine Kinase 75    


 


Troponin I LESS THAN 0.02    


 


B-Type Natriuretic Peptide 53    


 


Total Protein 5.8    


 


Albumin 3.0    














 Date/Time Procedure Status





Source Growth 


 


 8/15/17 19:09 Aerobic Blood Culture - Preliminary Resulted





Blood Peripheral NO GROWTH IN 1 DAY 





 8/15/17 19:09 Anaerobic Blood Culture - Preliminary Resulted





Blood Peripheral NO GROWTH IN 1 DAY 


 


 8/15/17 18:35 Influenza Types A,B Antigen ([facility]) - Final Complete





Nasal Washing NEGATIVE FOR FLU A AND B ANTIGEN.... 








Result Diagram:  


8/16/17 0453                                                                   

             8/16/17 0453





Imaging





Last Impressions








Lung Scan-VQ Nuclear Medicine 8/15/17 1755 Signed





Impressions: 





 Service Date/Time:  Tuesday, August 15, 2017 19:41 - CONCLUSION:  Intermediate 





 probability for pulmonary embolus with 2 subsegmental perfusion deficits of 

the 





 right lung noted.     Javier Ojeda MD 


 


Chest X-Ray 8/15/17 1755 Signed





Impressions: 





 Service Date/Time:  Tuesday, August 15, 2017 18:40 - CONCLUSION:  Chronic and 





 surgical changes as above. No acute abnormality demonstrated.     Javier Ojeda MD 











Assessment and Plan


Problem List:  


(1) COPD exacerbation


ICD Code:  J44.1


Status:  Acute


(2) Dyspnea


ICD Code:  R06.00


Status:  Acute


(3) Chronic kidney disease


ICD Code:  N18.9


Status:  Acute


(4) Hypertension


ICD Code:  I10


Status:  Acute


Assessment and Plan


85-year-old male with:





COPD exacerbation/? PE: VQ scan reports intermediate probability of PE.  

However the patient does have a history of lung cancer and lobectomy on the 

right side.  I discussed the history and the VQ scan findings with the 

radiologist.  He reports it is probably low probability of PE.


- Given the patient has A. fib.,  He should probably be anticoagulated either 

way.  Currently on heparin.  Will leave him on heparin today and plan to 

transition him to oral anticoagulation tomorrow.


Consult pulmonology for assistance. 


Start IV Solu-Medrol.  Continue duo nebs, breathing treatments.  Add Symbicort.





Possible PE: I am not entirely convinced that he has a PE. I discussed the VQ 

scan with radiology who indicated this is likely low probability of PE. 


- Appreciate further input from Pulmonology. 


- Check venous Doppler for evidence of clots. 





Atrial fibrillation: Continue Cardizem.  Plan for on anticoagulation as above.





Hypertension: On diltiazem as above.  Continue lisinopril.





Chronic kidney disease stage III: Patient has 1 functioning kidney.  History of 

nephrectomy for renal cancer.


Avoid nephrotoxins.  Follow renal functions.





GI prophylaxis: PPI.  Stool softener PRN constipation. 





DVT PPx: On heparin





Physician Certification


2 Midnight Certification Type:  Admission for Inpatient Services


Order for Inpatient Services


The services are ordered in accordance with Medicare regulations or non-

Medicare payer requirements, as applicable.  In the case of services not 

specified as inpatient-only, they are appropriately provided as inpatient 

services in accordance with the 2-midnight benchmark.


Estimated LOS (days):  3


 days is the estimated time the patient will need to remain in the hospital, 

assuming treatment plan goals are met and no additional complications.


Post-Hospital Plan:  Home








Hayley Gupta MD Aug 16, 2017 11:21

## 2017-08-16 NOTE — EKG
Date Performed: 08/15/2017       Time Performed: 18:07:52

 

PTAGE:      85 years

 

EKG:      Sinus rhythm 

 

 WITH SHORT [place] INTERVAL WITH OCCASIONAL SUPRAVENTRICULAR PREMATURE COMPLEXES MODERATE INTRAVENTRICULA
R CONDUCTION DELAY ABNORMAL QRS-T ANGLE ABNORMAL ECG

 

PREVIOUS TRACING       : 08/09/2017 10.09 Compared to prior tracing no significant change

 

DOCTOR:   Rajat Giron  Interpretating Date/Time  08/16/2017 00:29:11

## 2017-08-16 NOTE — RADRPT
EXAM DATE/TIME:  08/16/2017 14:37 

 

[facility] COMPARISON:     

No previous studies available for comparison.

        

 

 

INDICATIONS :                

Shortness of breath.

            

 

MEDICAL HISTORY :     

Hypercholesterolemia. Aneurysm, abdominal. Emphysema. Cancer; right lung, right kidney, right adrenal
 gland, and skin. Shingles. Bilateral cataracts. Afib. DVT. Pulmonary embolism. COPD. Pneumonia. Dysp
kai. GERD. HTN. Chronic kidney disease. BPH. Anticoagulant therapy, Aspirin 81mg. Blood transfusions.
 Radiation therapy. 

 

SURGICAL HISTORY :     

Abdominal aortic aneurysm repair.Nephrectomy, right. Inguinal hernia repair.Right upper lobectomy.  

 

ENCOUNTER:     

Initial

 

ACUITY:     

1 day

 

PAIN SCORE:      

0/10

 

LOCATION:      

Bilateral  leg.

                       

 

TECHNIQUE:     

Venous ultrasound of the left and right leg was performed from the inguinal ligament to the proximal 
calf.  Real-time, color Doppler and spectral tracing, compression and augmentation techniques were us
ed.  

 

FINDINGS:     

 

RIGHT LEG:     

There is normal compressibility of the deep venous system from the inguinal region to the proximal ca
lf.  No echogenic clot is seen in the lumen of the common femoral, femoral, popliteal, and posterior 
tibial veins.  There is a normal response of the venous system to proximal and distal augmentation an
d respiration.  

 

LEFT LEG:     

There is normal compressibility of the deep venous system from the inguinal region to the proximal ca
lf.  No echogenic clot is seen in the lumen of the common femoral, femoral, popliteal, and posterior 
tibial veins.  There is a normal response of the venous system to proximal and distal augmentation an
d respiration.  

 

CONCLUSION:     Negative exam with no evidence of thrombosis. 

 

 

 Jesse Spring MD on August 16, 2017 at 15:18           

Board Certified Radiologist.

 This report was verified electronically.

## 2017-08-17 VITALS
RESPIRATION RATE: 18 BRPM | DIASTOLIC BLOOD PRESSURE: 78 MMHG | TEMPERATURE: 97.7 F | SYSTOLIC BLOOD PRESSURE: 182 MMHG | OXYGEN SATURATION: 96 % | HEART RATE: 72 BPM

## 2017-08-17 VITALS
SYSTOLIC BLOOD PRESSURE: 122 MMHG | TEMPERATURE: 97.8 F | HEART RATE: 62 BPM | RESPIRATION RATE: 16 BRPM | OXYGEN SATURATION: 99 % | DIASTOLIC BLOOD PRESSURE: 53 MMHG

## 2017-08-17 VITALS
RESPIRATION RATE: 18 BRPM | OXYGEN SATURATION: 98 % | DIASTOLIC BLOOD PRESSURE: 67 MMHG | TEMPERATURE: 97.4 F | SYSTOLIC BLOOD PRESSURE: 162 MMHG | HEART RATE: 65 BPM

## 2017-08-17 VITALS
SYSTOLIC BLOOD PRESSURE: 173 MMHG | HEART RATE: 72 BPM | RESPIRATION RATE: 18 BRPM | OXYGEN SATURATION: 95 % | TEMPERATURE: 97.4 F | DIASTOLIC BLOOD PRESSURE: 75 MMHG

## 2017-08-17 VITALS — SYSTOLIC BLOOD PRESSURE: 154 MMHG | DIASTOLIC BLOOD PRESSURE: 70 MMHG

## 2017-08-17 VITALS
RESPIRATION RATE: 16 BRPM | TEMPERATURE: 97.3 F | OXYGEN SATURATION: 98 % | SYSTOLIC BLOOD PRESSURE: 169 MMHG | DIASTOLIC BLOOD PRESSURE: 86 MMHG | HEART RATE: 67 BPM

## 2017-08-17 VITALS
RESPIRATION RATE: 18 BRPM | HEART RATE: 66 BPM | TEMPERATURE: 96.2 F | OXYGEN SATURATION: 96 % | DIASTOLIC BLOOD PRESSURE: 85 MMHG | SYSTOLIC BLOOD PRESSURE: 180 MMHG

## 2017-08-17 VITALS — OXYGEN SATURATION: 98 %

## 2017-08-17 VITALS — HEART RATE: 72 BPM

## 2017-08-17 VITALS — OXYGEN SATURATION: 96 %

## 2017-08-17 LAB
ANION GAP SERPL CALC-SCNC: 11 MEQ/L (ref 5–15)
APTT BLD: 74.1 SEC (ref 24.3–30.1)
BASOPHILS # BLD AUTO: 0 TH/MM3 (ref 0–0.2)
BASOPHILS NFR BLD: 0 % (ref 0–2)
BUN SERPL-MCNC: 44 MG/DL (ref 7–18)
CHLORIDE SERPL-SCNC: 102 MEQ/L (ref 98–107)
EOSINOPHIL # BLD: 0 TH/MM3 (ref 0–0.4)
EOSINOPHIL NFR BLD: 0.2 % (ref 0–4)
ERYTHROCYTE [DISTWIDTH] IN BLOOD BY AUTOMATED COUNT: 13.4 % (ref 11.6–17.2)
GFR SERPLBLD BASED ON 1.73 SQ M-ARVRAT: 34 ML/MIN (ref 89–?)
HCO3 BLD-SCNC: 21.3 MEQ/L (ref 21–32)
HCT VFR BLD CALC: 35.8 % (ref 39–51)
HEMO FLAGS: (no result)
LYMPHOCYTES # BLD AUTO: 0.4 TH/MM3 (ref 1–4.8)
LYMPHOCYTES NFR BLD AUTO: 2.2 % (ref 9–44)
MCH RBC QN AUTO: 28.7 PG (ref 27–34)
MCHC RBC AUTO-ENTMCNC: 32.8 % (ref 32–36)
MCV RBC AUTO: 87.4 FL (ref 80–100)
MONOCYTES NFR BLD: 4 % (ref 0–8)
NEUTROPHILS # BLD AUTO: 16.1 TH/MM3 (ref 1.8–7.7)
NEUTROPHILS NFR BLD AUTO: 93.6 % (ref 16–70)
PLATELET # BLD: 164 TH/MM3 (ref 150–450)
POTASSIUM SERPL-SCNC: 4 MEQ/L (ref 3.5–5.1)
RBC # BLD AUTO: 4.09 MIL/MM3 (ref 4.5–5.9)
SODIUM SERPL-SCNC: 134 MEQ/L (ref 136–145)
WBC # BLD AUTO: 17.2 TH/MM3 (ref 4–11)

## 2017-08-17 RX ADMIN — LISINOPRIL SCH MG: 20 TABLET ORAL at 08:25

## 2017-08-17 RX ADMIN — IPRATROPIUM BROMIDE AND ALBUTEROL SULFATE SCH AMPULE: .5; 3 SOLUTION RESPIRATORY (INHALATION) at 19:26

## 2017-08-17 RX ADMIN — IPRATROPIUM BROMIDE AND ALBUTEROL SULFATE SCH AMPULE: .5; 3 SOLUTION RESPIRATORY (INHALATION) at 11:00

## 2017-08-17 RX ADMIN — IPRATROPIUM BROMIDE AND ALBUTEROL SULFATE SCH AMPULE: .5; 3 SOLUTION RESPIRATORY (INHALATION) at 15:18

## 2017-08-17 RX ADMIN — PANTOPRAZOLE SODIUM SCH MG: 20 TABLET, DELAYED RELEASE ORAL at 08:24

## 2017-08-17 RX ADMIN — IPRATROPIUM BROMIDE AND ALBUTEROL SULFATE SCH AMPULE: .5; 3 SOLUTION RESPIRATORY (INHALATION) at 08:09

## 2017-08-17 RX ADMIN — Medication SCH ML: at 22:17

## 2017-08-17 RX ADMIN — BUDESONIDE AND FORMOTEROL FUMARATE DIHYDRATE SCH PUFF: 160; 4.5 AEROSOL RESPIRATORY (INHALATION) at 22:16

## 2017-08-17 RX ADMIN — Medication SCH ML: at 08:25

## 2017-08-17 RX ADMIN — ASPIRIN SCH MG: 81 TABLET ORAL at 08:24

## 2017-08-17 RX ADMIN — BUDESONIDE AND FORMOTEROL FUMARATE DIHYDRATE SCH PUFF: 160; 4.5 AEROSOL RESPIRATORY (INHALATION) at 08:25

## 2017-08-17 RX ADMIN — DILTIAZEM HYDROCHLORIDE SCH MG: 180 CAPSULE, EXTENDED RELEASE ORAL at 08:24

## 2017-08-18 VITALS
HEART RATE: 76 BPM | DIASTOLIC BLOOD PRESSURE: 81 MMHG | OXYGEN SATURATION: 98 % | TEMPERATURE: 98.1 F | RESPIRATION RATE: 20 BRPM | SYSTOLIC BLOOD PRESSURE: 162 MMHG

## 2017-08-18 VITALS
DIASTOLIC BLOOD PRESSURE: 71 MMHG | RESPIRATION RATE: 18 BRPM | SYSTOLIC BLOOD PRESSURE: 131 MMHG | OXYGEN SATURATION: 96 % | TEMPERATURE: 96.5 F | HEART RATE: 80 BPM

## 2017-08-18 VITALS
DIASTOLIC BLOOD PRESSURE: 87 MMHG | SYSTOLIC BLOOD PRESSURE: 183 MMHG | RESPIRATION RATE: 24 BRPM | TEMPERATURE: 97.4 F | HEART RATE: 80 BPM | OXYGEN SATURATION: 99 %

## 2017-08-18 VITALS
RESPIRATION RATE: 18 BRPM | OXYGEN SATURATION: 96 % | SYSTOLIC BLOOD PRESSURE: 186 MMHG | HEART RATE: 82 BPM | TEMPERATURE: 98.2 F | DIASTOLIC BLOOD PRESSURE: 88 MMHG

## 2017-08-18 VITALS — OXYGEN SATURATION: 98 %

## 2017-08-18 LAB
ANION GAP SERPL CALC-SCNC: 10 MEQ/L (ref 5–15)
BUN SERPL-MCNC: 44 MG/DL (ref 7–18)
CHLORIDE SERPL-SCNC: 100 MEQ/L (ref 98–107)
ERYTHROCYTE [DISTWIDTH] IN BLOOD BY AUTOMATED COUNT: 13.7 % (ref 11.6–17.2)
GFR SERPLBLD BASED ON 1.73 SQ M-ARVRAT: 36 ML/MIN (ref 89–?)
HCO3 BLD-SCNC: 24.5 MEQ/L (ref 21–32)
HCT VFR BLD CALC: 35.6 % (ref 39–51)
MCH RBC QN AUTO: 29.3 PG (ref 27–34)
MCHC RBC AUTO-ENTMCNC: 33.6 % (ref 32–36)
MCV RBC AUTO: 87.3 FL (ref 80–100)
PLATELET # BLD: 155 TH/MM3 (ref 150–450)
POTASSIUM SERPL-SCNC: 4.3 MEQ/L (ref 3.5–5.1)
RBC # BLD AUTO: 4.08 MIL/MM3 (ref 4.5–5.9)
REVIEW FLAG: (no result)
SODIUM SERPL-SCNC: 134 MEQ/L (ref 136–145)
WBC # BLD AUTO: 19.1 TH/MM3 (ref 4–11)

## 2017-08-18 RX ADMIN — IPRATROPIUM BROMIDE AND ALBUTEROL SULFATE SCH AMPULE: .5; 3 SOLUTION RESPIRATORY (INHALATION) at 11:35

## 2017-08-18 RX ADMIN — ASPIRIN SCH MG: 81 TABLET ORAL at 09:19

## 2017-08-18 RX ADMIN — BUDESONIDE AND FORMOTEROL FUMARATE DIHYDRATE SCH PUFF: 160; 4.5 AEROSOL RESPIRATORY (INHALATION) at 09:00

## 2017-08-18 RX ADMIN — DILTIAZEM HYDROCHLORIDE SCH MG: 180 CAPSULE, EXTENDED RELEASE ORAL at 09:20

## 2017-08-18 RX ADMIN — Medication SCH ML: at 09:23

## 2017-08-18 RX ADMIN — LISINOPRIL SCH MG: 20 TABLET ORAL at 09:20

## 2017-08-18 RX ADMIN — IPRATROPIUM BROMIDE AND ALBUTEROL SULFATE SCH AMPULE: .5; 3 SOLUTION RESPIRATORY (INHALATION) at 08:25

## 2017-08-18 NOTE — HHI.DS
__________________________________________________





Discharge Summary


Admission Date


Aug 15, 2017 at 20:37


Discharge Date:  Aug 18, 2017


Admitting Diagnosis


Dyspnea w/ntermediate probability PE, exac copd, HTN





(1) COPD exacerbation


ICD Code:  J44.1


(2) Dyspnea


ICD Code:  R06.00


(3) Chronic kidney disease


ICD Code:  N18.9


(4) Hypertension


ICD Code:  I10


Procedures


None


Brief History - From Admission


85-year-old male with a medical history significant for lung cancer status post 

resection, COPD, aortic aneurysm status post repair, chronic kidney disease, AAA

, history of nephrectomy secondary to renal cancer.  The patient presented to 

the hospital with complaints of worsening shortness of breath over the past 

week.  He presented to the ER a few days ago and was sent home with amoxicillin 

and prednisone.  He reports after taking amoxicillin for a couple of days, he 

believe he made him feel worse.  He has had increasing shortness of breath 

yesterday which prompted the emergency room visit.  He reports a productive 

cough.  He reports chronic intermittent bilateral ankle swelling.


CBC/BMP:  


8/18/17 0503                                                                   

             8/18/17 0503





Significant Findings





Laboratory Tests








Test 8/15/17 8/16/17 8/16/17 8/16/17





 18:20 04:00 04:53 09:10


 


Red Blood Count 4.31 MIL/MM3  4.49 MIL/MM3 





 (4.50-5.90)  (4.50-5.90) 


 


Hemoglobin 12.5 GM/DL   





 (13.0-17.0)   


 


Hematocrit 38.0 %   





 (39.0-51.0)   


 


Platelet Count 148 TH/MM3   





 (150-450)   


 


Neutrophils (%) (Auto) 76.8 %  95.5 % 





 (16.0-70.0)  (16.0-70.0) 


 


Lymphocytes (%) (Auto) 7.3 %  3.4 % 





 (9.0-44.0)  (9.0-44.0) 


 


Monocytes (%) (Auto) 13.0 %   





 (0.0-8.0)   


 


Lymphocytes # (Auto) 0.7 TH/MM3  0.3 TH/MM3 





 (1.0-4.8)  (1.0-4.8) 


 


Monocytes # (Auto) 1.2 TH/MM3   





 (0-0.9)   


 


Activated Partial 23.7 SEC GREATER THAN GREATER THAN 48.8 SEC





Thromboplast Time (24.3-30.1) 153.4 .0 SEC (24.3-30.1)





  (24.3-30.1) (24.3-30.1) 


 


D-Dimer Quantitative (PE/DVT) 7.98 MG/L FEU   





 (0.00-0.50)   


 


Blood Urea Nitrogen 33 MG/DL (7-18)  35 MG/DL (7-18) 


 


Creatinine 1.80 MG/DL  1.80 MG/DL 





 (0.60-1.30)  (0.60-1.30) 


 


Estimat Glomerular Filtration 36 ML/MIN (>89)  36 ML/MIN (>89) 





Rate    


 


Calcium Level 7.7 MG/DL  8.0 MG/DL 





 (8.5-10.1)  (8.5-10.1) 


 


Troponin I LESS THAN 0.02   





 NG/ML   





 (0.02-0.05)   


 


Total Protein 5.8 GM/DL   





 (6.4-8.2)   


 


Albumin 3.0 GM/DL   





 (3.4-5.0)   


 


Neutrophils # (Auto)   9.7 TH/MM3 





   (1.8-7.7) 


 


Random Glucose   265 MG/DL 





   () 


 


    





Test 8/16/17 8/16/17 8/17/17 8/18/17





 15:40 22:00 05:25 05:03


 


Activated Partial 61.1 SEC 69.6 SEC 74.1 SEC 





Thromboplast Time (24.3-30.1) (24.3-30.1) (24.3-30.1) 


 


White Blood Count   17.2 TH/MM3 19.1 TH/MM3





   (4.0-11.0) (4.0-11.0)


 


Red Blood Count   4.09 MIL/MM3 4.08 MIL/MM3





   (4.50-5.90) (4.50-5.90)


 


Hemoglobin   11.7 GM/DL 11.9 GM/DL





   (13.0-17.0) (13.0-17.0)


 


Hematocrit   35.8 % 35.6 %





   (39.0-51.0) (39.0-51.0)


 


Neutrophils (%) (Auto)   93.6 % 





   (16.0-70.0) 


 


Lymphocytes (%) (Auto)   2.2 % 





   (9.0-44.0) 


 


Neutrophils # (Auto)   16.1 TH/MM3 





   (1.8-7.7) 


 


Lymphocytes # (Auto)   0.4 TH/MM3 





   (1.0-4.8) 


 


Sodium Level   134 MEQ/L 134 MEQ/L





   (136-145) (136-145)


 


Blood Urea Nitrogen   44 MG/DL (7-18) 44 MG/DL (7-18)


 


Creatinine   1.90 MG/DL 1.80 MG/DL





   (0.60-1.30) (0.60-1.30)


 


Estimat Glomerular Filtration   34 ML/MIN (>89) 36 ML/MIN (>89)





Rate    


 


Random Glucose   277 MG/ MG/DL





   () ()


 


Calcium Level   7.6 MG/DL 7.8 MG/DL





   (8.5-10.1) (8.5-10.1)








Imaging





Last Impressions








Lower Extremity Ultrasound 8/16/17 0000 Signed





Impressions: 





 Service Date/Time:  Wednesday, August 16, 2017 14:37 - CONCLUSION: Negative 

exam 





 with no evidence of thrombosis.     Jesse Spring MD 


 


Lung Scan- Nuclear Medicine 8/15/17 1755 Signed





Impressions: 





 Service Date/Time:  Tuesday, August 15, 2017 19:41 - CONCLUSION:  Intermediate 





 probability for pulmonary embolus with 2 subsegmental perfusion deficits of 

the 





 right lung noted.     Javier Ojeda MD 


 


Chest X-Ray 8/15/17 1755 Signed





Impressions: 





 Service Date/Time:  Tuesday, August 15, 2017 18:40 - CONCLUSION:  Chronic and 





 surgical changes as above. No acute abnormality demonstrated.     Javier Ojeda MD 








PE at Discharge


GENERAL: Elderly male.  Dyspneic as baseline.


CARDIOVASCULAR: Normal rate and regular rhythm without murmurs, gallops, or 

rubs. 


RESPIRATORY: Good respiratory efforts. Diminished breath sounds throughout.  No 

wheezing noted today.


GASTROINTESTINAL: Abdomen soft, non-tender, non-distended. Normal active bowel 

sounds


MUSCULOSKELETAL: Extremities without cyanosis, or edema.


NEURO:  Alert & Oriented x4 to person, place, time, situation.  Moves all ext x4


PSYCH: Appropriate mood and affect.


Pt update on day of discharge


Patient reports is feeling okay today.  Breathing improved.  He feels 

comfortable going home.  We discussed discharge at length and the need to follow

-up outpatient with cardiology and pulmonology.  All of his questions were 

answered.


Hospital Course


85-year-old male who presented with worsening shortness of breath.  Evaluation 

and treatment course detailed below:





COPD exacerbation/? PE: VQ scan reports intermediate probability of PE.  

However the patient does have a history of lung cancer and lobectomy on the 

right side.  I discussed the history and the VQ scan findings with the 

radiologist.  He reports it is probably low probability of PE.  Patient was 

started on heparin on admission.  We had a long discussion regarding risk and 

benefit of anticoagulation.  He has competing needs.  He has paroxysmal atrial 

fibrillation and reports that after his nephrectomy, he did have a blood clot 

on the right side of his lung where the cancer was resected.  However he 

reports he was put on Coumadin once and had hemoptysis, hematuria and blood in 

his stool.  Therefore this was discontinued.  I discussed risk of stroke and 

worsening PE if in fact he does have a PE.  I also discussed the risk of 

hemorrhage with anticoagulation.  At this point the patient opted to not use 

anticoagulant and continue treatment for COPD.  He will follow-up with his 

cardiologist and pulmonologist outpatient.  Heparin was discontinued.  He 

remained stable.  He was transitioned to oral prednisone.  He was continued on 

breathing treatments.  He reports he had adverse effect with Symbicort and 

refused to take it.  His breathing status improved and he was deemed stable for 

discharge.





Atrial fibrillation: Continue Cardizem.  Aspirin.





Hypertension: On diltiazem as above.  Continue lisinopril.  Blood pressure was 

not well-controlled.  Hydralazine was added to his regimen.  He is advised to 

follow-up outpatient for further titration.





Chronic kidney disease stage III: Patient has 1 functioning kidney.  History of 

nephrectomy for renal cancer.  Renal function remained stable throughout his 

hospitalization.


Pt Condition on Discharge:  Good


Discharge Disposition:  Discharge Home


Discharge Time:  > 30 minutes


Discharge Instructions


DIET: Follow Instructions for:  Heart Healthy Diet


Activities you can perform:  Regular-No Restrictions


Follow up Referrals:  


Cardiology


Pulmonology





New Medications:  


Prednisone (Prednisone) 20 Mg Tab


20 MG PO AS DIRECTED Take 40 MG daily x 4 days, then 20 MG x 4 days #12 Ref 0 

TAB


Hydralazine HCl (Hydralazine HCl) 25 Mg Tablet


25 MG PO Q8HR #90 TAB


 


Continued Medications:  


Aspirin DR (Aspirin EC) 81 Mg Tabdr


81 MG PO DAILY Ref 0 TAB


Diltiazem CD 24 HR (Diltiazem CD 24 HR) 360 Mg Capcr


360 MG PO DAILY #30 Ref 0 CAP


Ipratropium-Albuterol Neb (Duoneb) 0.5-2.5 Mg/3 Ml Neb


1 NEBULE INH Q4HR NEB PRN SHORTNESS OF BREATH #120 Ref 0 NEBULE


Lisinopril (Lisinopril) 20 Mg Tab


20 MG PO DAILY #30 Ref 0 TAB


Omeprazole (Omeprazole) 20 Mg Tab


20 MG PO DAILY #30 Ref 0 TAB


Oxygen (O2) (Oxygen (O2))  Device


2-5 LITER FACE.MASK CONTINUOUS Oxygen Concentrator Portable Gaseous 2-5 L/min 

via NFace Mask Continuous For 99 months Prevent Hypoxemia #2 CYLINDER


 


Discontinued Medications:  


Amoxicillin (Amoxicillin) 500 Mg Cap


500 MG PO TID Infection #30 Ref 0 CAP











Hayley Gupta MD Aug 18, 2017 10:28

## 2017-08-18 NOTE — HHI.DCPOC
Discharge Care Plan


Diagnosis:  


(1) COPD exacerbation


(2) Hypertension


(3) Chronic kidney disease


(4) Dyspnea


(5) GERD (gastroesophageal reflux disease)


Additional Problems


Indeterminate probability of PE.


Goals to Promote Your Health


* To prevent worsening of your condition and complications


* To maintain your health at the optimal level


Directions to Meet Your Goals


*** Take your medications as prescribed


*** Follow your dietary instruction


*** Follow activity as directed








*** Keep your appointments as scheduled


*** Take your immunizations and boosters as scheduled


*** If your symptoms worsen call your PCP, if no PCP go to Urgent Care Center 

or Emergency Room***


*** Smoking is Dangerous to Your Health. Avoid second hand smoke***


***Call the 24-hour hour crisis hotline for domestic abuse at 1-674.918.4913***








Hayley Gupta MD Aug 18, 2017 10:27

## 2018-03-10 ENCOUNTER — HOSPITAL ENCOUNTER (INPATIENT)
Dept: HOSPITAL 17 - PHEFT | Age: 83
LOS: 5 days | Discharge: NURSING, REHAB OR LONG-TERM CARE | DRG: 193 | End: 2018-03-15
Attending: HOSPITALIST | Admitting: HOSPITALIST
Payer: MEDICARE

## 2018-03-10 VITALS
RESPIRATION RATE: 20 BRPM | DIASTOLIC BLOOD PRESSURE: 86 MMHG | HEART RATE: 97 BPM | OXYGEN SATURATION: 96 % | SYSTOLIC BLOOD PRESSURE: 152 MMHG

## 2018-03-10 VITALS — OXYGEN SATURATION: 92 %

## 2018-03-10 VITALS
OXYGEN SATURATION: 96 % | DIASTOLIC BLOOD PRESSURE: 68 MMHG | HEART RATE: 104 BPM | RESPIRATION RATE: 22 BRPM | SYSTOLIC BLOOD PRESSURE: 122 MMHG | TEMPERATURE: 97.6 F

## 2018-03-10 VITALS — OXYGEN SATURATION: 96 %

## 2018-03-10 VITALS
DIASTOLIC BLOOD PRESSURE: 105 MMHG | SYSTOLIC BLOOD PRESSURE: 156 MMHG | OXYGEN SATURATION: 96 % | HEART RATE: 108 BPM | RESPIRATION RATE: 24 BRPM | TEMPERATURE: 97.8 F

## 2018-03-10 VITALS
TEMPERATURE: 98.1 F | HEART RATE: 96 BPM | OXYGEN SATURATION: 99 % | DIASTOLIC BLOOD PRESSURE: 72 MMHG | SYSTOLIC BLOOD PRESSURE: 157 MMHG | RESPIRATION RATE: 22 BRPM

## 2018-03-10 VITALS — HEIGHT: 68 IN | WEIGHT: 148.15 LBS | BODY MASS INDEX: 22.45 KG/M2

## 2018-03-10 VITALS — OXYGEN SATURATION: 97 %

## 2018-03-10 VITALS — OXYGEN SATURATION: 99 %
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
## 2018-03-10 DIAGNOSIS — Z85.528: ICD-10-CM

## 2018-03-10 DIAGNOSIS — K21.9: ICD-10-CM

## 2018-03-10 DIAGNOSIS — J44.1: ICD-10-CM

## 2018-03-10 DIAGNOSIS — Z86.711: ICD-10-CM

## 2018-03-10 DIAGNOSIS — Z79.82: ICD-10-CM

## 2018-03-10 DIAGNOSIS — I48.0: ICD-10-CM

## 2018-03-10 DIAGNOSIS — Z85.118: ICD-10-CM

## 2018-03-10 DIAGNOSIS — I12.9: ICD-10-CM

## 2018-03-10 DIAGNOSIS — J10.1: Primary | ICD-10-CM

## 2018-03-10 DIAGNOSIS — J96.01: ICD-10-CM

## 2018-03-10 DIAGNOSIS — Z87.891: ICD-10-CM

## 2018-03-10 DIAGNOSIS — R51: ICD-10-CM

## 2018-03-10 DIAGNOSIS — Z85.858: ICD-10-CM

## 2018-03-10 DIAGNOSIS — N18.3: ICD-10-CM

## 2018-03-10 DIAGNOSIS — Z85.828: ICD-10-CM

## 2018-03-10 DIAGNOSIS — E78.5: ICD-10-CM

## 2018-03-10 LAB
ALBUMIN SERPL-MCNC: 2.7 GM/DL (ref 3.4–5)
ALP SERPL-CCNC: 71 U/L (ref 45–117)
ALT SERPL-CCNC: 27 U/L (ref 12–78)
AST SERPL-CCNC: 19 U/L (ref 15–37)
BASOPHILS # BLD AUTO: 0.2 TH/MM3 (ref 0–0.2)
BASOPHILS NFR BLD: 1.7 % (ref 0–2)
BILIRUB SERPL-MCNC: 0.3 MG/DL (ref 0.2–1)
BUN SERPL-MCNC: 59 MG/DL (ref 7–18)
CALCIUM SERPL-MCNC: 8.5 MG/DL (ref 8.5–10.1)
CHLORIDE SERPL-SCNC: 93 MEQ/L (ref 98–107)
CREAT SERPL-MCNC: 1.8 MG/DL (ref 0.6–1.3)
EOSINOPHIL # BLD: 0 TH/MM3 (ref 0–0.4)
EOSINOPHIL NFR BLD: 0.1 % (ref 0–4)
ERYTHROCYTE [DISTWIDTH] IN BLOOD BY AUTOMATED COUNT: 14.6 % (ref 11.6–17.2)
GFR SERPLBLD BASED ON 1.73 SQ M-ARVRAT: 36 ML/MIN (ref 89–?)
GLUCOSE SERPL-MCNC: 156 MG/DL (ref 74–106)
HCO3 BLD-SCNC: 24.8 MEQ/L (ref 21–32)
HCT VFR BLD CALC: 42.2 % (ref 39–51)
HGB BLD-MCNC: 14 GM/DL (ref 13–17)
INR PPP: 1 RATIO
LYMPHOCYTES # BLD AUTO: 0.5 TH/MM3 (ref 1–4.8)
LYMPHOCYTES NFR BLD AUTO: 4.1 % (ref 9–44)
MCH RBC QN AUTO: 29.4 PG (ref 27–34)
MCHC RBC AUTO-ENTMCNC: 33.3 % (ref 32–36)
MCV RBC AUTO: 88.3 FL (ref 80–100)
MONOCYTE #: 0.2 TH/MM3 (ref 0–0.9)
MONOCYTES NFR BLD: 2 % (ref 0–8)
NEUTROPHILS # BLD AUTO: 10.6 TH/MM3 (ref 1.8–7.7)
NEUTROPHILS NFR BLD AUTO: 92.1 % (ref 16–70)
PLATELET # BLD: 155 TH/MM3 (ref 150–450)
PMV BLD AUTO: 7.9 FL (ref 7–11)
PROT SERPL-MCNC: 6.1 GM/DL (ref 6.4–8.2)
PROTHROMBIN TIME: 10.4 SEC (ref 9.8–11.6)
RBC # BLD AUTO: 4.78 MIL/MM3 (ref 4.5–5.9)
SODIUM SERPL-SCNC: 129 MEQ/L (ref 136–145)
TROPONIN I SERPL-MCNC: (no result) NG/ML (ref 0.02–0.05)
WBC # BLD AUTO: 11.5 TH/MM3 (ref 4–11)

## 2018-03-10 PROCEDURE — 94640 AIRWAY INHALATION TREATMENT: CPT

## 2018-03-10 PROCEDURE — 71045 X-RAY EXAM CHEST 1 VIEW: CPT

## 2018-03-10 PROCEDURE — 85025 COMPLETE CBC W/AUTO DIFF WBC: CPT

## 2018-03-10 PROCEDURE — 82805 BLOOD GASES W/O2 SATURATION: CPT

## 2018-03-10 PROCEDURE — 94664 DEMO&/EVAL PT USE INHALER: CPT

## 2018-03-10 PROCEDURE — 84484 ASSAY OF TROPONIN QUANT: CPT

## 2018-03-10 PROCEDURE — 87804 INFLUENZA ASSAY W/OPTIC: CPT

## 2018-03-10 PROCEDURE — 82550 ASSAY OF CK (CPK): CPT

## 2018-03-10 PROCEDURE — 80053 COMPREHEN METABOLIC PANEL: CPT

## 2018-03-10 PROCEDURE — 5A09357 ASSISTANCE WITH RESPIRATORY VENTILATION, LESS THAN 24 CONSECUTIVE HOURS, CONTINUOUS POSITIVE AIRWAY PRESSURE: ICD-10-PCS | Performed by: EMERGENCY MEDICINE

## 2018-03-10 PROCEDURE — 83880 ASSAY OF NATRIURETIC PEPTIDE: CPT

## 2018-03-10 PROCEDURE — 93005 ELECTROCARDIOGRAM TRACING: CPT

## 2018-03-10 PROCEDURE — 36600 WITHDRAWAL OF ARTERIAL BLOOD: CPT

## 2018-03-10 PROCEDURE — 85610 PROTHROMBIN TIME: CPT

## 2018-03-10 PROCEDURE — 85730 THROMBOPLASTIN TIME PARTIAL: CPT

## 2018-03-10 PROCEDURE — 96361 HYDRATE IV INFUSION ADD-ON: CPT

## 2018-03-10 PROCEDURE — 96374 THER/PROPH/DIAG INJ IV PUSH: CPT

## 2018-03-10 PROCEDURE — 94002 VENT MGMT INPAT INIT DAY: CPT

## 2018-03-10 PROCEDURE — 87040 BLOOD CULTURE FOR BACTERIA: CPT

## 2018-03-10 RX ADMIN — IPRATROPIUM BROMIDE AND ALBUTEROL SULFATE SCH AMPULE: .5; 3 SOLUTION RESPIRATORY (INHALATION) at 19:13

## 2018-03-10 RX ADMIN — IPRATROPIUM BROMIDE AND ALBUTEROL SULFATE SCH AMPULE: .5; 3 SOLUTION RESPIRATORY (INHALATION) at 17:27

## 2018-03-10 RX ADMIN — Medication SCH ML: at 21:00

## 2018-03-10 RX ADMIN — IPRATROPIUM BROMIDE AND ALBUTEROL SULFATE SCH AMPULE: .5; 3 SOLUTION RESPIRATORY (INHALATION) at 17:28

## 2018-03-10 RX ADMIN — AZITHROMYCIN SCH MG: 250 TABLET, FILM COATED ORAL at 20:01

## 2018-03-10 RX ADMIN — STANDARDIZED SENNA CONCENTRATE AND DOCUSATE SODIUM SCH TAB: 8.6; 5 TABLET, FILM COATED ORAL at 21:00

## 2018-03-10 RX ADMIN — ENOXAPARIN SODIUM SCH MG: 30 INJECTION SUBCUTANEOUS at 20:02

## 2018-03-10 NOTE — PD
HPI


Chief Complaint:  Respiratory Symptoms


Time Seen by Provider:  16:43


Travel History


International Travel<30 days:  No


Contact w/Intl Traveler<30days:  No


Traveled to known affect area:  No





History of Present Illness


HPI


86-year-old male with history of COPD, lung cancer status post right lobectomy, 

nephrectomy, thoracic aortic aneurysm repaired, AAA being monitored, paroxysmal 

A. fib on aspirin, here for evaluation of shortness of breath.  Patient has had 

shortness of breath since 3/6/18 and has been seen by his pulmonologist Dr. Stokes daily where he is receiving daily steroid infusions as well as 

doxycycline for reported pulmonary infection.  Shortness of breath is at rest, 

worse with exertion.  No chest pain.  He is having a productive cough.  No 

hemoptysis.





I discussed with the patient that his breathing is very labored and that if he 

does not improve he may need to be intubated.  I described what intubation is, 

and the patient tells me he does not want to be intubated





PFSH


Past Medical History


Hx Anticoagulant Therapy:  Yes (asa 81mg)


Asthma:  No


Atrial Fibrillation:  Yes


Autoimmune Disease:  No


Anxiety:  No


Depression:  No


Heart Rhythm Problems:  No


Cancer:  Yes ( RIGHTLUNG, RIGHT KIDNEY,  RIGHT ADRENAL GLAND CANCER, SKIN)


Cardiovascular Problems:  Yes (AAA )


High Cholesterol:  Yes


Chemotherapy:  No


Chest Pain:  No


Congestive Heart Failure:  No


COPD:  Yes (ADVANCED EMPHYSEMA)


Diminished Hearing:  Yes (has hearing aids)


Deep Vein Thrombosis:  Yes (PE )


Endocrine:  No


Gastrointestinal Disorders:  Yes


GERD:  Yes


Hiatal Hernia:  No


Hypertension:  Yes


Immune Disorder:  No


Implanted Vascular Access Dvce:  No


Kidney Stones:  No


Musculoskeletal:  No


Neurologic:  No


Psychiatric:  No


Reproductive:  No


Respiratory:  Yes (COPD)


Immunizations Current:  Yes


Pneumonia:  Yes


Radiation Therapy:  Yes (SKIN TO SCALP BASAL CELL)


Renal Failure:  Yes


Shingles:  Yes


Sleep Apnea:  No


Ulcer:  No





Past Surgical History


Abdominal Surgery:  Yes ( AORTIC ANEURYSM REPAIR THORACIC REPAIRED)


Cardiac Surgery:  No


Endocrine Surgery:  No


Eye Surgery:  No


Genitourinary Surgery:  Yes (RT NEPHRECTOMY>CA)


Gynecologic Surgery:  No


Thoracic Surgery:  Yes (RT UPPER LOBECTOMY R/T CANCER)


Other Surgery:  Yes





Social History


Alcohol Use:  Yes (BEER TWICE/WK)


Tobacco Use:  No (QUIT IN 1995 smoked cigs at age 14)


Substance Use:  No





Allergies-Medications


(Allergen,Severity, Reaction):  


Coded Allergies:  


     morphine (Unverified  Allergy, Severe, Hallucinations, 8/15/17)


Reported Meds & Prescriptions





Reported Meds & Active Scripts


Active


Reported


Doxycycline Hyclate 100 Mg Cap 100 Mg PO BID


Duoneb (Ipratropium-Albuterol Neb) 0.5-2.5 Mg/3 Ml Neb 1 Nebule INH Q4HR NEB PRN


Aspirin EC (Aspirin) 81 Mg Tabdr 81 Mg PO DAILY


Omeprazole 20 Mg Tab 20 Mg PO DAILY


Lisinopril 20 Mg Tab 20 Mg PO DAILY


Diltiazem CD 24  Mg Capcr 360 Mg PO DAILY








Review of Systems


Except as stated in HPI:  all other systems reviewed are Neg





Physical Exam


Narrative


GENERAL: Well-developed, well-nourished, labored breathing, moderate 

respiratory distress, speaking a few words at a time.


SKIN: Focused skin assessment warm/dry.


HEAD: Atraumatic. Normocephalic. 


EYES: Pupils equal and round. No scleral icterus. No injection or drainage. 


ENT: Mucous membranes pink and moist.


NECK: Trachea midline. No JVD. 


CARDIOVASCULAR: Regular rate and rhythm.  No murmur appreciated.


RESPIRATORY: Labored breathing, accessory muscle use, speaking a few words at a 

time, poor air movement bilaterally.


GASTROINTESTINAL: Abdomen soft, non-tender, nondistended.  Ventral hernia that 

is easily reducible.


MUSCULOSKELETAL: No obvious deformities. No clubbing.  No cyanosis.  No edema. 


NEUROLOGICAL: Awake and alert. No obvious cranial nerve deficits.  Motor 

grossly within normal limits. Normal speech.


PSYCHIATRIC: Appropriate mood and affect; insight and judgment normal.





Data


Data


Last Documented VS





Vital Signs








  Date Time  Temp Pulse Resp B/P (MAP) Pulse Ox O2 Delivery O2 Flow Rate FiO2


 


3/10/18 17:40     97   30


 


3/10/18 17:25        


 


3/10/18 17:25 98.1 96 22   BiPAP  








Orders





 Orders


Complete Blood Count With Diff (3/10/18 16:49)


Comprehensive Metabolic Panel (3/10/18 16:49)


B-Type Natriuretic Peptide (3/10/18 16:49)


Act Partial Throm Time (Ptt) (3/10/18 16:49)


Prothrombin Time / Inr (Pt) (3/10/18 16:49)


Ckmb (Isoenzyme) Profile (3/10/18 16:49)


Troponin I (3/10/18 16:49)


Influenzae A/B Antigen (3/10/18 16:49)


Blood Culture (3/10/18 16:49)


Iv Access Insert/Monitor (3/10/18 16:49)


Ecg Monitoring (3/10/18 16:49)


Oximetry (3/10/18 16:49)


Oxygen Administration (3/10/18 16:49)


Chest, Single Ap (3/10/18 16:49)


Sodium Chloride 0.9% Flush (Ns Flush) (3/10/18 17:00)


Albuterol-Ipratropium Neb (Duoneb Neb) (3/10/18 17:00)


Resp Bipap / Cpap Non Invas Vt (3/10/18 16:49)


Arterial Blood Gas (Abg) (3/10/18 )


Sodium Chlorid 0.9% 500 Ml Inj (Ns 500 M (3/10/18 17:45)


Methylprednisolone So Succ Inj (Solumedr (3/10/18 18:15)





Labs





Laboratory Tests








Test


  3/10/18


17:05 3/10/18


17:18


 


White Blood Count 11.5 TH/MM3  


 


Red Blood Count 4.78 MIL/MM3  


 


Hemoglobin 14.0 GM/DL  


 


Hematocrit 42.2 %  


 


Mean Corpuscular Volume 88.3 FL  


 


Mean Corpuscular Hemoglobin 29.4 PG  


 


Mean Corpuscular Hemoglobin


Concent 33.3 % 


  


 


 


Red Cell Distribution Width 14.6 %  


 


Platelet Count 155 TH/MM3  


 


Mean Platelet Volume 7.9 FL  


 


Neutrophils (%) (Auto) 92.1 %  


 


Lymphocytes (%) (Auto) 4.1 %  


 


Monocytes (%) (Auto) 2.0 %  


 


Eosinophils (%) (Auto) 0.1 %  


 


Basophils (%) (Auto) 1.7 %  


 


Neutrophils # (Auto) 10.6 TH/MM3  


 


Lymphocytes # (Auto) 0.5 TH/MM3  


 


Monocytes # (Auto) 0.2 TH/MM3  


 


Eosinophils # (Auto) 0.0 TH/MM3  


 


Basophils # (Auto) 0.2 TH/MM3  


 


CBC Comment DIFF FINAL  


 


Differential Comment   


 


Prothrombin Time 10.4 SEC  


 


Prothromb Time International


Ratio 1.0 RATIO 


  


 


 


Activated Partial


Thromboplast Time 21.1 SEC 


  


 


 


Blood Urea Nitrogen 59 MG/DL  


 


Creatinine 1.80 MG/DL  


 


Random Glucose 156 MG/DL  


 


Total Protein 6.1 GM/DL  


 


Albumin 2.7 GM/DL  


 


Calcium Level 8.5 MG/DL  


 


Alkaline Phosphatase 71 U/L  


 


Aspartate Amino Transf


(AST/SGOT) 19 U/L 


  


 


 


Alanine Aminotransferase


(ALT/SGPT) 27 U/L 


  


 


 


Total Bilirubin 0.3 MG/DL  


 


Sodium Level 129 MEQ/L  


 


Potassium Level 5.2 MEQ/L  


 


Chloride Level 93 MEQ/L  


 


Carbon Dioxide Level 24.8 MEQ/L  


 


Anion Gap 11 MEQ/L  


 


Estimat Glomerular Filtration


Rate 36 ML/MIN 


  


 


 


Total Creatine Kinase 99 U/L  


 


Troponin I


  LESS THAN 0.02


NG/ML 


 


 


B-Type Natriuretic Peptide 78 PG/ML  


 


Blood Gas Puncture Site  LT RADIAL 


 


Blood Gas Patient Temperature  98.6 


 


Blood Gas HCO3  21 mmol/L 


 


Blood Gas Base Excess  -1.8 mmol/L 


 


Blood Gas Oxygen Saturation  92 % 


 


Arterial Blood pH  7.48 


 


Arterial Blood Partial


Pressure CO2 


  29 mmHG 


 


 


Arterial Blood Partial


Pressure O2 


  75 mmHG 


 


 


Arterial Blood Oxygen Content  18.2 Vol % 


 


Arterial Blood


Carboxyhemoglobin 


  1.1 % 


 


 


Arterial Blood Methemoglobin  1.8 % 


 


Blood Gas Hemoglobin  14.1 G/DL 


 


Oxygen Delivery Device  ROOM AIR 


 


Blood Gas Inspired Oxygen  21 % 











[facility]


Medical Decision Making


Medical Screen Exam Complete:  Yes


Emergency Medical Condition:  Yes


Medical Record Reviewed:  Yes


Interpretation(s)


EKG: Atrial fibrillation, rate 97


Differential Diagnosis


COPD exacerbation, pneumonia, pneumothorax, pulmonary edema, PE, ACS


Narrative Course


Vital signs reviewed.





The patient was started on BiPAP shortly after my assessment.





CBC: WBC 11.5, hemoglobin 14, hematocrit 42.2, platelets 155, neutrophils 92%.


CMP is remarkable for sodium 129, chloride 93, potassium 5.2, BUN 59, 

creatinine 1.8, GFR 36 which is all around his baseline, otherwise unremarkable.


BNP is 78.


Cardiac enzymes are negative.





Chest x-ray:


CONCLUSION:     


Chronic left lung base opacity suggesting scarring. Prominent aortic arch 

silhouette unchanged. No acute cardiopulmonary disease identified.





ABG on room air:


PH 7.47


PCO2 29.2


PO2 75





Patient is doing quite well on BiPAP with significant improvement in his 

respiratory status.  He was also given 3 DuoNeb treatments and IV Solu-Medrol.  

Again the patient does not want to be intubated.  He will be admitted for 

further treatment and evaluation of COPD exacerbation.





Case discussed with hospitalist Dr. Bunn who will admit the patient to his 

service.





Diagnosis





 Primary Impression:  


 COPD exacerbation





Admitting Information


Admitting Physician Requests:  Admit











Oliverio Johnson MD Mar 10, 2018 16:54

## 2018-03-10 NOTE — HHI.HP
__________________________________________________





[facility]


Service


[facility]


Primary Care Physician


Corrine Milligan MD


Admission Diagnosis





COPD Exacerbation


Diagnoses:  


Travel History


International Travel<30 Days:  No


Contact w/Intl Traveler <30 Da:  No


Traveled to Known Affected Are:  No


History of Present Illness


Mr. Parra is an 86 year old male.  He came to the hospital secondary to 

exacerbation of his COPD.  He's been dealing with COPD exacerbation for most of 

the preceding week.  He visited his pulmonologist 4 days ago and was prescribed 

steroids and antibiotics.  This helped a little bit but his breathing has 

worsened again today so he came for help.  He has a history of hypercarbia.  In 

the ER he has been started on BiPAP and this has resulted in a decrease in his 

symptoms of respiratory distress.  He has not had any measurable fevers yet but 

she may have been exposed to influenza as his wife reports that she had 

influenza earlier this week.  This patient has received a flu vaccine this 

year.  No other complaints tonight.  No chest pain.





Review of Systems


Constitutional:  DENIES: Fatigue, Fever, Chills, Night Sweats


Eyes:  DENIES: Diplopia, Eye inflammation, Eye pain


Ears, nose, mouth, throat:  DENIES: Hearing loss, Vertigo, Nasal discharge


Respiratory:  COMPLAINS OF: Cough, Wheezing, Shortness of breath


Cardiovascular:  DENIES: Chest pain, Palpitations, Syncope


Gastrointestinal:  DENIES: Abdominal pain, Black stools, Bloody stools


Musculoskeletal:  DENIES: Joint pain, Muscle aches, Stiffness


Integumentary:  DENIES: Abnormal pigmentation, Nail changes, Pruritus, Rash


Hematologic/lymphatic:  DENIES: Bruising, Lymphadenopathy


Immunologic/allergic:  DENIES: Eczema, Urticaria


Neurologic:  DENIES: Abnormal gait, Headache, Paresthesias


Psychiatric:  DENIES: Anxiety, Confusion, Depression





Past Family Social History


Past Medical History


Atrial fibrillation


COPD


Chronic kidney disease


History of right lung cancer


History of right kidney cancer


History of right adrenal gland cancer


History of basal cell cancer of scalp


Abdominal aortic aneurysm


History of thoracic aortic aneurysm


History of hyperlipidemia


Presbycusis


History of pulmonary embolism


Gastroesophageal reflux disease


Hypertension


History of shingles











Past Surgical History


Abdominal Surgery:  Yes ( AORTIC ANEURYSM REPAIR THORACIC REPAIRED)


Cardiac Surgery:  No


Endocrine Surgery:  No


Eye Surgery:  No


Genitourinary Surgery:  Yes (RT NEPHRECTOMY>CA)


Gynecologic Surgery:  No


Thoracic Surgery:  Yes (RT UPPER LOBECTOMY R/T CANCER)


Other Surgery:  Yes





Social History


Alcohol Use:  Yes (BEER TWICE/WK)


Tobacco Use:  No (QUIT IN  smoked cigs at age 14)


Substance Use:  No


Past Surgical History


Repair of thoracic aortic aneurysm


Right nephrectomy


Right upper lobectomy of lung


Reported Medications





Reported Meds & Active Scripts


Active


Reported


Doxycycline Hyclate 100 Mg Cap 100 Mg PO BID


Duoneb (Ipratropium-Albuterol Neb) 0.5-2.5 Mg/3 Ml Neb 1 Nebule INH Q4HR NEB PRN


Aspirin EC (Aspirin) 81 Mg Tabdr 81 Mg PO DAILY


Omeprazole 20 Mg Tab 20 Mg PO DAILY


Lisinopril 20 Mg Tab 20 Mg PO DAILY


Diltiazem CD 24  Mg Capcr 360 Mg PO DAILY


Allergies:  


Coded Allergies:  


     morphine (Unverified  Allergy, Severe, Hallucinations, 8/15/17)


Family History


None reported


Social History


Occasional alcohol use


Past history of smoking, not currently smoking


No illicit drug abuse





Physical Exam


Vital Signs





Vital Signs








  Date Time  Temp Pulse Resp B/P (MAP) Pulse Ox O2 Delivery O2 Flow Rate FiO2


 


3/10/18 17:40     97   30


 


3/10/18 17:25        


 


3/10/18 17:25 98.1 96 22 157/72 (100) 99 BiPAP  


 


3/10/18 17:22  99 22  99 BiPAP  


 


3/10/18 16:58     96 Room Air  


 


3/10/18 16:50 97.8 108 24 156/105 (122) 96   








Physical Exam


GENERAL: NAD, A&Ox3


HEAD: Normocephalic. 


NECK: Supple, trachea midline. No lymphadenopathy.


EYES: No scleral icterus. No injection or drainage. 


CARDIOVASCULAR: Regular rate and rhythm without murmurs, gallops, or rubs. 


RESPIRATORY: Breath sounds equal bilaterally.  Poor excursion, accessory muscle 

use.


GASTROINTESTINAL: Abdomen soft, non-tender, nondistended. 


MUSCULOSKELETAL: No cyanosis, or edema. 


SKIN: Warm and dry.


NEURO:  No focal neurological deficitis.


Laboratory





Laboratory Tests








Test


  3/10/18


17:05 3/10/18


17:18


 


White Blood Count 11.5  


 


Red Blood Count 4.78  


 


Hemoglobin 14.0  


 


Hematocrit 42.2  


 


Mean Corpuscular Volume 88.3  


 


Mean Corpuscular Hemoglobin 29.4  


 


Mean Corpuscular Hemoglobin


Concent 33.3 


  


 


 


Red Cell Distribution Width 14.6  


 


Platelet Count 155  


 


Mean Platelet Volume 7.9  


 


Neutrophils (%) (Auto) 92.1  


 


Lymphocytes (%) (Auto) 4.1  


 


Monocytes (%) (Auto) 2.0  


 


Eosinophils (%) (Auto) 0.1  


 


Basophils (%) (Auto) 1.7  


 


Neutrophils # (Auto) 10.6  


 


Lymphocytes # (Auto) 0.5  


 


Monocytes # (Auto) 0.2  


 


Eosinophils # (Auto) 0.0  


 


Basophils # (Auto) 0.2  


 


CBC Comment DIFF FINAL  


 


Differential Comment   


 


Prothrombin Time 10.4  


 


Prothromb Time International


Ratio 1.0 


  


 


 


Activated Partial


Thromboplast Time 21.1 


  


 


 


Blood Urea Nitrogen 59  


 


Creatinine 1.80  


 


Random Glucose 156  


 


Total Protein 6.1  


 


Albumin 2.7  


 


Calcium Level 8.5  


 


Alkaline Phosphatase 71  


 


Aspartate Amino Transf


(AST/SGOT) 19 


  


 


 


Alanine Aminotransferase


(ALT/SGPT) 27 


  


 


 


Total Bilirubin 0.3  


 


Sodium Level 129  


 


Potassium Level 5.2  


 


Chloride Level 93  


 


Carbon Dioxide Level 24.8  


 


Anion Gap 11  


 


Estimat Glomerular Filtration


Rate 36 


  


 


 


Total Creatine Kinase 99  


 


Troponin I LESS THAN 0.02  


 


B-Type Natriuretic Peptide 78  


 


Blood Gas Puncture Site  LT RADIAL 


 


Blood Gas Patient Temperature  98.6 


 


Blood Gas HCO3  21 


 


Blood Gas Base Excess  -1.8 


 


Blood Gas Oxygen Saturation  92 


 


Arterial Blood pH  7.48 


 


Arterial Blood Partial


Pressure CO2 


  29 


 


 


Arterial Blood Partial


Pressure O2 


  75 


 


 


Arterial Blood Oxygen Content  18.2 


 


Arterial Blood


Carboxyhemoglobin 


  1.1 


 


 


Arterial Blood Methemoglobin  1.8 


 


Blood Gas Hemoglobin  14.1 


 


Oxygen Delivery Device  ROOM AIR 


 


Blood Gas Inspired Oxygen  21 














 Date/Time


Source Procedure


Growth Status


 


 


 3/10/18 17:10


Blood Peripheral Aerobic Blood Culture


Pending Received


 


 3/10/18 17:10


Blood Peripheral Anaerobic Blood Culture


Pending Received


 


 3/10/18 17:00


Nasal Washing Influenza Types A,B Antigen ([facility]) - Final


Positive For Flu A Antigen Complete








Result Diagram:  


3/10/18 1705                                                                   

             3/10/18 1705





Imaging





Last Impressions








Chest X-Ray 3/10/18 3329 Signed





Impressions: 





 Service Date/Time:  Saturday, March 10, 2018 16:58 - CONCLUSION:  Chronic left 





 lung base opacity suggesting scarring. Prominent aortic arch silhouette 





 unchanged. No acute cardiopulmonary disease identified.     Timothy R Jones, MD Caprini VTE Risk Assessment


Caprini VTE Risk Assessment:  No/Low Risk (score <= 1)


Caprini Risk Assessment Model











 Point Value = 1          Point Value = 2  Point Value = 3  Point Value = 5


 


Age 41-60


Minor surgery


BMI > 25 kg/m2


Swollen legs


Varicose veins


Pregnancy or postpartum


History of unexplained or recurrent


   spontaneous 


Oral contraceptives or hormone


   replacement


Sepsis (< 1 month)


Serious lung disease, including


   pneumonia (< 1 month)


Abnormal pulmonary function


Acute myocardial infarction


Congestive heart failure (< 1 month)


History of inflammatory bowel disease


Medical patient at bed rest Age 61-74


Arthroscopic surgery


Major open surgery (> 45 min)


Laparoscopic surgery (> 45 min)


Malignancy


Confined to bed (> 72 hours)


Immobilizing plaster cast


Central venous access Age >= 75


History of VTE


Family history of VTE


Factor V Leiden


Prothrombin 17894Q


Lupus anticoagulant


Anticardiolipin antibodies


Elevated serum homocysteine


Heparin-induced thrombocytopenia


Other congenital or acquired


   thrombophilia Stroke (< 1 month)


Elective arthroplasty


Hip, pelvis, or leg fracture


Acute spinal cord injury (< 1 month)








Prophylaxis Regimen











   Total Risk


Factor Score Risk Level Prophylaxis Regimen


 


0-1      Low Early ambulation


 


2 Moderate Order ONE of the following:


*Sequential Compression Device (SCD)


*Heparin 5000 units SQ BID


 


3-4 Higher Order ONE of the following medications:


*Heparin 5000 units SQ TID


*Enoxaparin/Lovenox 40 mg SQ daily (WT < 150 kg, CrCl > 30 mL/min)


*Enoxaparin/Lovenox 30 mg SQ daily (WT < 150 kg, CrCl > 10-29 mL/min)


*Enoxaparin/Lovenox 30 mg SQ BID (WT < 150 kg, CrCl > 30 mL/min)


AND/OR


*Sequential Compression Device (SCD)


 


5 or more Highest Order ONE of the following medications:


*Heparin 5000 units SQ TID (Preferred with Epidurals)


*Enoxaparin/Lovenox 40 mg SQ daily (WT < 150 kg, CrCl > 30 mL/min)


*Enoxaparin/Lovenox 30 mg SQ daily (WT < 150 kg, CrCl > 10-29 mL/min)


*Enoxaparin/Lovenox 30 mg SQ BID (WT < 150 kg, CrCl > 30 mL/min)


AND


*Sequential Compression Device (SCD)











Assessment and Plan


Problem List:  


(1) Hypoxia


ICD Code:  R09.02 - Hypoxemia


(2) Acute respiratory failure


ICD Code:  J96.00 - Acute respiratory failure, unspecified whether with hypoxia 

or hypercapnia


(3) COPD exacerbation


ICD Code:  J44.1 - Chronic obstructive pulmonary disease with (acute) 

exacerbation


Status:  Acute


(4) COPD (chronic obstructive pulmonary disease)


ICD Code:  J44.9 - COPD (chronic obstructive pulmonary disease)


Status:  Acute


Assessment and Plan


86-year-old male admitted secondary to COPD exacerbation with acute respiratory 

failure and hypoxia.





COPD exacerbation


Acute respiratory failure


Hypoxia


Continue oxygen supplements as needed


Schedule duo nebs


When necessary albuterol


Azithromycin


Systemic steroids


Follow for improvement in respiratory status


Follow for improvement in exertional tolerance next





Atrial fibrillation


Follow on telemetry


Continue baseline treatments





Chronic kidney disease


Monitor renal function


Avoid nephrotoxins





History of right lung cancer


History of right kidney cancer


History of right adrenal gland cancer


History of basal cell cancer of scalp


Abdominal aortic aneurysm


History of thoracic aortic aneurysm


Follows in outpatient


History of lung lobectomy would be contributory to patient's respiratory status





Hyperlipidemia


Continue present treatment


Follow as an outpatient





Hypertension


Continue baseline treatment


Follow blood pressures


Adjust treatments as needed





DVT prophylaxis


History of pulmonary and was in


Lovenox


Plan to renal adjusted if GFR drops below 30





Physician Certification


2 Midnight Certification Type:  Admission for Inpatient Services


Order for Inpatient Services


The services are ordered in accordance with Medicare regulations or non-

Medicare payer requirements, as applicable.  In the case of services not 

specified as inpatient-only, they are appropriately provided as inpatient 

services in accordance with the 2-midnight benchmark.


Estimated LOS (days):  3


 days is the estimated time the patient will need to remain in the hospital, 

assuming treatment plan goals are met and no additional complications.


Post-Hospital Plan:  Home











Will Bunn MD Mar 10, 2018 19:16

## 2018-03-10 NOTE — EKG
Date Performed: 03/10/2018       Time Performed: 16:44:06

 

PTAGE:      86 years

 

EKG:      Baseline artifact present PROBABLE ATRIAL FIBRILLATION Nonspecific ST and T wave abnormalit
ies ABNORMAL ECG Compared to prior electrocardiogram, atrial fibrillation is now present and ST segme
nt and T wave changes are more marked .

 

DOCTOR:   Venkatesh Martin  Interpretating Date/Time  03/10/2018 21:51:47

## 2018-03-10 NOTE — RADRPT
EXAM DATE/TIME:  03/10/2018 16:58 

 

[facility] COMPARISON:     

CHEST SINGLE AP, August 15, 2017, 18:40.

 

                     

INDICATIONS :     

Short of breath

                     

 

MEDICAL HISTORY :     

Carcinoma, lung.  Emphysema.  Chronic obstructive pulmonary disease.      

 

SURGICAL HISTORY :     

Lobectomy.   

 

ENCOUNTER:     

Initial                                        

 

ACUITY:     

1 week      

 

PAIN SCORE:     

0/10

 

LOCATION:     

Bilateral chest 

 

FINDINGS:     

Single AP view of the chest. Patchy opacity in the left lung base unchanged. Right lung clear. Cardia
c mediastinal silhouette unchanged. No evidence of pleural effusion or pneumothorax. Prominent aortic
 arch silhouette unchanged.

 

CONCLUSION:     

Chronic left lung base opacity suggesting scarring. Prominent aortic arch silhouette unchanged. No ac
[place] cardiopulmonary disease identified.

 

 

 

 Dawit Duncan MD on March 10, 2018 at 17:18           

Board Certified Radiologist.

 This report was verified electronically.

## 2018-03-11 VITALS
DIASTOLIC BLOOD PRESSURE: 96 MMHG | OXYGEN SATURATION: 93 % | RESPIRATION RATE: 18 BRPM | SYSTOLIC BLOOD PRESSURE: 164 MMHG | TEMPERATURE: 97.8 F | HEART RATE: 94 BPM

## 2018-03-11 VITALS — OXYGEN SATURATION: 93 %

## 2018-03-11 VITALS
OXYGEN SATURATION: 93 % | DIASTOLIC BLOOD PRESSURE: 64 MMHG | SYSTOLIC BLOOD PRESSURE: 126 MMHG | RESPIRATION RATE: 30 BRPM | HEART RATE: 86 BPM

## 2018-03-11 VITALS — OXYGEN SATURATION: 91 % | HEART RATE: 82 BPM | RESPIRATION RATE: 17 BRPM | TEMPERATURE: 98.2 F

## 2018-03-11 VITALS
RESPIRATION RATE: 19 BRPM | DIASTOLIC BLOOD PRESSURE: 75 MMHG | TEMPERATURE: 97.9 F | HEART RATE: 104 BPM | OXYGEN SATURATION: 96 % | SYSTOLIC BLOOD PRESSURE: 156 MMHG

## 2018-03-11 VITALS
TEMPERATURE: 98.1 F | DIASTOLIC BLOOD PRESSURE: 78 MMHG | RESPIRATION RATE: 26 BRPM | SYSTOLIC BLOOD PRESSURE: 147 MMHG | HEART RATE: 88 BPM | OXYGEN SATURATION: 92 %

## 2018-03-11 VITALS
RESPIRATION RATE: 29 BRPM | DIASTOLIC BLOOD PRESSURE: 69 MMHG | OXYGEN SATURATION: 94 % | HEART RATE: 94 BPM | SYSTOLIC BLOOD PRESSURE: 114 MMHG

## 2018-03-11 VITALS — HEART RATE: 106 BPM

## 2018-03-11 VITALS
RESPIRATION RATE: 30 BRPM | HEART RATE: 102 BPM | SYSTOLIC BLOOD PRESSURE: 183 MMHG | OXYGEN SATURATION: 93 % | DIASTOLIC BLOOD PRESSURE: 102 MMHG

## 2018-03-11 VITALS — OXYGEN SATURATION: 93 % | HEART RATE: 100 BPM | RESPIRATION RATE: 25 BRPM

## 2018-03-11 VITALS
TEMPERATURE: 98.1 F | OXYGEN SATURATION: 90 % | HEART RATE: 108 BPM | DIASTOLIC BLOOD PRESSURE: 89 MMHG | RESPIRATION RATE: 18 BRPM | SYSTOLIC BLOOD PRESSURE: 135 MMHG

## 2018-03-11 VITALS
HEART RATE: 104 BPM | OXYGEN SATURATION: 92 % | DIASTOLIC BLOOD PRESSURE: 99 MMHG | SYSTOLIC BLOOD PRESSURE: 166 MMHG | RESPIRATION RATE: 34 BRPM

## 2018-03-11 VITALS — RESPIRATION RATE: 18 BRPM | HEART RATE: 102 BPM | OXYGEN SATURATION: 94 % | TEMPERATURE: 98.1 F

## 2018-03-11 VITALS — HEART RATE: 102 BPM

## 2018-03-11 VITALS — HEART RATE: 108 BPM

## 2018-03-11 VITALS — HEART RATE: 88 BPM

## 2018-03-11 VITALS — OXYGEN SATURATION: 91 % | HEART RATE: 82 BPM | RESPIRATION RATE: 18 BRPM

## 2018-03-11 LAB
ALBUMIN SERPL-MCNC: 2.5 GM/DL (ref 3.4–5)
ALP SERPL-CCNC: 62 U/L (ref 45–117)
ALT SERPL-CCNC: 26 U/L (ref 12–78)
AST SERPL-CCNC: 34 U/L (ref 15–37)
BASOPHILS # BLD AUTO: 0 TH/MM3 (ref 0–0.2)
BASOPHILS NFR BLD: 0.4 % (ref 0–2)
BILIRUB SERPL-MCNC: 0.5 MG/DL (ref 0.2–1)
BUN SERPL-MCNC: 59 MG/DL (ref 7–18)
CALCIUM SERPL-MCNC: 8 MG/DL (ref 8.5–10.1)
CHLORIDE SERPL-SCNC: 98 MEQ/L (ref 98–107)
CREAT SERPL-MCNC: 1.6 MG/DL (ref 0.6–1.3)
EOSINOPHIL # BLD: 0 TH/MM3 (ref 0–0.4)
EOSINOPHIL NFR BLD: 0.1 % (ref 0–4)
ERYTHROCYTE [DISTWIDTH] IN BLOOD BY AUTOMATED COUNT: 15 % (ref 11.6–17.2)
GFR SERPLBLD BASED ON 1.73 SQ M-ARVRAT: 41 ML/MIN (ref 89–?)
GLUCOSE SERPL-MCNC: 171 MG/DL (ref 74–106)
HCO3 BLD-SCNC: 22.6 MEQ/L (ref 21–32)
HCT VFR BLD CALC: 40.8 % (ref 39–51)
HGB BLD-MCNC: 13.9 GM/DL (ref 13–17)
LYMPHOCYTES # BLD AUTO: 0.2 TH/MM3 (ref 1–4.8)
LYMPHOCYTES NFR BLD AUTO: 1.6 % (ref 9–44)
MCH RBC QN AUTO: 30 PG (ref 27–34)
MCHC RBC AUTO-ENTMCNC: 34 % (ref 32–36)
MCV RBC AUTO: 88.3 FL (ref 80–100)
MONOCYTE #: 0.6 TH/MM3 (ref 0–0.9)
MONOCYTES NFR BLD: 5.4 % (ref 0–8)
NEUTROPHILS # BLD AUTO: 10.2 TH/MM3 (ref 1.8–7.7)
NEUTROPHILS NFR BLD AUTO: 92.5 % (ref 16–70)
PLATELET # BLD: 192 TH/MM3 (ref 150–450)
PMV BLD AUTO: 8.6 FL (ref 7–11)
PROT SERPL-MCNC: 5.7 GM/DL (ref 6.4–8.2)
RBC # BLD AUTO: 4.62 MIL/MM3 (ref 4.5–5.9)
SODIUM SERPL-SCNC: 132 MEQ/L (ref 136–145)
WBC # BLD AUTO: 11 TH/MM3 (ref 4–11)

## 2018-03-11 RX ADMIN — ENOXAPARIN SODIUM SCH MG: 30 INJECTION SUBCUTANEOUS at 19:47

## 2018-03-11 RX ADMIN — IPRATROPIUM BROMIDE AND ALBUTEROL SULFATE SCH AMPULE: .5; 3 SOLUTION RESPIRATORY (INHALATION) at 07:17

## 2018-03-11 RX ADMIN — ASPIRIN SCH MG: 81 TABLET ORAL at 08:53

## 2018-03-11 RX ADMIN — Medication SCH ML: at 19:39

## 2018-03-11 RX ADMIN — IPRATROPIUM BROMIDE AND ALBUTEROL SULFATE SCH AMPULE: .5; 3 SOLUTION RESPIRATORY (INHALATION) at 14:50

## 2018-03-11 RX ADMIN — Medication SCH ML: at 08:55

## 2018-03-11 RX ADMIN — IPRATROPIUM BROMIDE AND ALBUTEROL SULFATE SCH AMPULE: .5; 3 SOLUTION RESPIRATORY (INHALATION) at 20:45

## 2018-03-11 RX ADMIN — DILTIAZEM HYDROCHLORIDE SCH MG: 180 CAPSULE, EXTENDED RELEASE ORAL at 08:55

## 2018-03-11 RX ADMIN — LISINOPRIL SCH MG: 20 TABLET ORAL at 08:55

## 2018-03-11 RX ADMIN — STANDARDIZED SENNA CONCENTRATE AND DOCUSATE SODIUM SCH TAB: 8.6; 5 TABLET, FILM COATED ORAL at 19:39

## 2018-03-11 RX ADMIN — PANTOPRAZOLE SODIUM SCH MG: 20 TABLET, DELAYED RELEASE ORAL at 08:54

## 2018-03-11 RX ADMIN — ALBUTEROL SULFATE PRN MG: 2.5 SOLUTION RESPIRATORY (INHALATION) at 11:57

## 2018-03-11 RX ADMIN — AZITHROMYCIN SCH MG: 250 TABLET, FILM COATED ORAL at 19:47

## 2018-03-11 RX ADMIN — STANDARDIZED SENNA CONCENTRATE AND DOCUSATE SODIUM SCH TAB: 8.6; 5 TABLET, FILM COATED ORAL at 08:54

## 2018-03-11 NOTE — HHI.PR
Subjective


Remarks


Patient has improvement overnight.  Increased excursion.  He is tolerating 

oxygenation with nasal cannula now.  He has not yet to baseline.





Objective





Vital Signs








  Date Time  Temp Pulse Resp B/P (MAP) Pulse Ox O2 Delivery O2 Flow Rate FiO2


 


3/11/18 10:00  108      


 


3/11/18 09:01  104 34 166/99 (121) 92   


 


3/11/18 09:01  104      


 


3/11/18 08:54 97.8 94 18 164/96 (118) 93   


 


3/11/18 08:10  102      


 


3/11/18 08:10  102 30 183/102 (129) 93   


 


3/11/18 07:18     93   21


 


3/11/18 06:00  106      


 


3/11/18 04:00 97.9 108 20 140/89 (106) 90   


 


3/11/18 04:00  104      


 


3/11/18 04:00 98.1 106 18 135/71 (92) 93   


 


3/11/18 02:00  102      


 


3/11/18 00:00 98.1 102 18  94   


 


3/10/18 23:15     92   21


 


3/10/18 22:00 97.6 104 22 122/68 (86) 96   


 


3/10/18 22:00  80      


 


3/10/18 20:41        


 


3/10/18 19:10     99   21


 


3/10/18 19:00  97 20 152/86 (108) 96 BiPAP  


 


3/10/18 17:40     97   30


 


3/10/18 17:25        


 


3/10/18 17:25 98.1 96 22 157/72 (100) 99 BiPAP  


 


3/10/18 17:22  99 22  99 BiPAP  


 


3/10/18 16:58     96 Room Air  


 


3/10/18 16:50 97.8 108 24 156/105 (122) 96   














I/O      


 


 3/10/18 3/10/18 3/10/18 3/11/18 3/11/18 3/11/18





 07:00 15:00 23:00 07:00 15:00 23:00


 


Intake Total 100 ml   100 ml  


 


Output Total 400 ml   400 ml  


 


Balance -300 ml   -300 ml  


 


      


 


Intake Oral 100 ml   100 ml  


 


Output Urine Total 400 ml   400 ml  


 


# Bowel Movements 0   0  








Result Diagram:  


3/11/18 0653                                                                   

             3/11/18 0653





Objective Remarks


GENERAL: NAD, A&Ox3


HEAD: Normocephalic. 


NECK: Supple, trachea midline. No lymphadenopathy.


EYES: No scleral icterus. No injection or drainage. 


CARDIOVASCULAR: Regular rate and rhythm without murmurs, gallops, or rubs. 


RESPIRATORY: Breath sounds equal bilaterally. No accessory muscle use.


GASTROINTESTINAL: Abdomen soft, non-tender, nondistended. 


MUSCULOSKELETAL: No cyanosis, or edema. 


SKIN: Warm and dry.


NEURO:  No focal neurological deficitis.





A/P


Problem List:  


(1) Hypoxia


ICD Code:  R09.02 - Hypoxemia


(2) Acute respiratory failure


ICD Code:  J96.00 - Acute respiratory failure, unspecified whether with hypoxia 

or hypercapnia


(3) COPD (chronic obstructive pulmonary disease)


ICD Code:  J44.9 - COPD (chronic obstructive pulmonary disease)


Status:  Acute


(4) COPD exacerbation


ICD Code:  J44.1 - Chronic obstructive pulmonary disease with (acute) 

exacerbation


Status:  Acute


Assessment and Plan


86-year-old male admitted secondary to COPD exacerbation with acute respiratory 

failure and hypoxia.





Some improvement overnight thus far.  Labs reviewed.  Electrolytes have 

remained stable.  Continue to monitor labs.  Labs ordered for further 

monitoring.





COPD exacerbation


Acute respiratory failure


Hypoxia


Improving 


Weaned off BiPAP 


Continue oxygen supplements as needed


Schedule duo nebs


When necessary albuterol


Azithromycin


Continue Systemic steroids


Follow for improvement in respiratory status


Follow for improvement in exertional tolerance next





Atrial fibrillation


Follow on telemetry


Continue baseline treatments





Chronic kidney disease


Monitor renal function


Avoid nephrotoxins





History of right lung cancer


History of right kidney cancer


History of right adrenal gland cancer


History of basal cell cancer of scalp


Abdominal aortic aneurysm


History of thoracic aortic aneurysm


Follows in outpatient


History of lung lobectomy would be contributory to patient's respiratory status





Hyperlipidemia


Continue present treatment


Follow as an outpatient





Hypertension


Continue baseline treatment


Follow blood pressures


Adjust treatments as needed





DVT prophylaxis


History of pulmonary embolism


Lovenox


Plan to renal adjusted if GFR drops below 30











Will Bunn MD Mar 11, 2018 11:10

## 2018-03-12 VITALS
DIASTOLIC BLOOD PRESSURE: 67 MMHG | TEMPERATURE: 97.3 F | OXYGEN SATURATION: 93 % | HEART RATE: 90 BPM | RESPIRATION RATE: 24 BRPM | SYSTOLIC BLOOD PRESSURE: 116 MMHG

## 2018-03-12 VITALS
DIASTOLIC BLOOD PRESSURE: 94 MMHG | HEART RATE: 95 BPM | OXYGEN SATURATION: 90 % | TEMPERATURE: 96.6 F | SYSTOLIC BLOOD PRESSURE: 146 MMHG | RESPIRATION RATE: 18 BRPM

## 2018-03-12 VITALS
OXYGEN SATURATION: 96 % | RESPIRATION RATE: 24 BRPM | DIASTOLIC BLOOD PRESSURE: 74 MMHG | SYSTOLIC BLOOD PRESSURE: 114 MMHG | HEART RATE: 90 BPM | TEMPERATURE: 96 F

## 2018-03-12 VITALS — HEART RATE: 76 BPM

## 2018-03-12 VITALS
HEART RATE: 89 BPM | RESPIRATION RATE: 20 BRPM | SYSTOLIC BLOOD PRESSURE: 130 MMHG | DIASTOLIC BLOOD PRESSURE: 77 MMHG | OXYGEN SATURATION: 97 % | TEMPERATURE: 97 F

## 2018-03-12 VITALS
RESPIRATION RATE: 24 BRPM | DIASTOLIC BLOOD PRESSURE: 69 MMHG | OXYGEN SATURATION: 94 % | HEART RATE: 109 BPM | SYSTOLIC BLOOD PRESSURE: 109 MMHG | TEMPERATURE: 97.8 F

## 2018-03-12 VITALS — OXYGEN SATURATION: 98 %

## 2018-03-12 VITALS — OXYGEN SATURATION: 95 %

## 2018-03-12 VITALS — HEART RATE: 90 BPM

## 2018-03-12 LAB
ALBUMIN SERPL-MCNC: 2.4 GM/DL (ref 3.4–5)
ALP SERPL-CCNC: 57 U/L (ref 45–117)
ALT SERPL-CCNC: 23 U/L (ref 12–78)
AST SERPL-CCNC: 17 U/L (ref 15–37)
BASOPHILS # BLD AUTO: 0 TH/MM3 (ref 0–0.2)
BASOPHILS NFR BLD: 0.2 % (ref 0–2)
BILIRUB SERPL-MCNC: 0.4 MG/DL (ref 0.2–1)
BUN SERPL-MCNC: 66 MG/DL (ref 7–18)
CALCIUM SERPL-MCNC: 7.9 MG/DL (ref 8.5–10.1)
CHLORIDE SERPL-SCNC: 98 MEQ/L (ref 98–107)
CREAT SERPL-MCNC: 1.5 MG/DL (ref 0.6–1.3)
EOSINOPHIL # BLD: 0 TH/MM3 (ref 0–0.4)
EOSINOPHIL NFR BLD: 0.2 % (ref 0–4)
ERYTHROCYTE [DISTWIDTH] IN BLOOD BY AUTOMATED COUNT: 14.8 % (ref 11.6–17.2)
GFR SERPLBLD BASED ON 1.73 SQ M-ARVRAT: 44 ML/MIN (ref 89–?)
GLUCOSE SERPL-MCNC: 146 MG/DL (ref 74–106)
HCO3 BLD-SCNC: 23.7 MEQ/L (ref 21–32)
HCT VFR BLD CALC: 39 % (ref 39–51)
HGB BLD-MCNC: 13 GM/DL (ref 13–17)
LYMPHOCYTES # BLD AUTO: 0.3 TH/MM3 (ref 1–4.8)
LYMPHOCYTES NFR BLD AUTO: 2.3 % (ref 9–44)
MCH RBC QN AUTO: 29.5 PG (ref 27–34)
MCHC RBC AUTO-ENTMCNC: 33.4 % (ref 32–36)
MCV RBC AUTO: 88.3 FL (ref 80–100)
MONOCYTE #: 1 TH/MM3 (ref 0–0.9)
MONOCYTES NFR BLD: 8.2 % (ref 0–8)
NEUTROPHILS # BLD AUTO: 10.4 TH/MM3 (ref 1.8–7.7)
NEUTROPHILS NFR BLD AUTO: 89.1 % (ref 16–70)
PLATELET # BLD: 137 TH/MM3 (ref 150–450)
PMV BLD AUTO: 8 FL (ref 7–11)
PROT SERPL-MCNC: 5.1 GM/DL (ref 6.4–8.2)
RBC # BLD AUTO: 4.41 MIL/MM3 (ref 4.5–5.9)
SODIUM SERPL-SCNC: 132 MEQ/L (ref 136–145)
WBC # BLD AUTO: 11.7 TH/MM3 (ref 4–11)

## 2018-03-12 RX ADMIN — DILTIAZEM HYDROCHLORIDE SCH MG: 180 CAPSULE, EXTENDED RELEASE ORAL at 09:29

## 2018-03-12 RX ADMIN — Medication SCH ML: at 09:28

## 2018-03-12 RX ADMIN — LISINOPRIL SCH MG: 20 TABLET ORAL at 09:40

## 2018-03-12 RX ADMIN — STANDARDIZED SENNA CONCENTRATE AND DOCUSATE SODIUM SCH TAB: 8.6; 5 TABLET, FILM COATED ORAL at 22:02

## 2018-03-12 RX ADMIN — Medication SCH ML: at 22:03

## 2018-03-12 RX ADMIN — IPRATROPIUM BROMIDE AND ALBUTEROL SULFATE SCH AMPULE: .5; 3 SOLUTION RESPIRATORY (INHALATION) at 20:19

## 2018-03-12 RX ADMIN — PANTOPRAZOLE SODIUM SCH MG: 20 TABLET, DELAYED RELEASE ORAL at 09:29

## 2018-03-12 RX ADMIN — IPRATROPIUM BROMIDE AND ALBUTEROL SULFATE SCH AMPULE: .5; 3 SOLUTION RESPIRATORY (INHALATION) at 14:52

## 2018-03-12 RX ADMIN — ENOXAPARIN SODIUM SCH MG: 30 INJECTION SUBCUTANEOUS at 22:03

## 2018-03-12 RX ADMIN — OSELTAMIVIR PHOSPHATE SCH MG: 30 CAPSULE ORAL at 22:03

## 2018-03-12 RX ADMIN — IPRATROPIUM BROMIDE AND ALBUTEROL SULFATE SCH AMPULE: .5; 3 SOLUTION RESPIRATORY (INHALATION) at 07:30

## 2018-03-12 RX ADMIN — STANDARDIZED SENNA CONCENTRATE AND DOCUSATE SODIUM SCH TAB: 8.6; 5 TABLET, FILM COATED ORAL at 21:00

## 2018-03-12 RX ADMIN — STANDARDIZED SENNA CONCENTRATE AND DOCUSATE SODIUM SCH TAB: 8.6; 5 TABLET, FILM COATED ORAL at 09:00

## 2018-03-12 RX ADMIN — ASPIRIN SCH MG: 81 TABLET ORAL at 09:00

## 2018-03-12 RX ADMIN — AZITHROMYCIN SCH MG: 250 TABLET, FILM COATED ORAL at 22:02

## 2018-03-12 RX ADMIN — OSELTAMIVIR PHOSPHATE SCH MG: 30 CAPSULE ORAL at 14:27

## 2018-03-12 NOTE — HHI.FF
Face to Face Verification


Diagnosis:  


(1) Hypoxia


(2) Acute respiratory failure


(3) COPD (chronic obstructive pulmonary disease)


(4) COPD exacerbation


Physical Therapy


Order:  Evaluate and Treat, Improve ambulation, Strength and gait training





Home Health Nursing








Order: Medical education





 Signs/symptoms of disease process





 Nursing assessment with vital signs

















I have seen patient Logan Parra on 3/12/18. My clinical findings 

support the need for the requested home health care services because:








 Ltd mobility - disease progression





 Patient has SOB





 Deconditioned w/ increased weakness





 Limited ability to care for self














I certify that my clinical findings support that this patient is homebound 

because:








 Hx COPD- exertion dyspnea/weakness





 Unsteady gait/balance





 Unsafe to leave home unassisted





 Unable to use public transportation

















Will Bunn MD Mar 12, 2018 10:26

## 2018-03-12 NOTE — HHI.PR
Subjective


Remarks


Slow but continued improvement overnight.  Patient's strength is poor and he 

will need to continue to work with physical therapy.  He is tolerating oxygen 

with nasal cannula and has been off nasal cannula occasionally.  No exertional 

tolerance present yet.  No new complaints today.





Objective





Vital Signs








  Date Time  Temp Pulse Resp B/P (MAP) Pulse Ox O2 Delivery O2 Flow Rate FiO2


 


3/12/18 12:00 97.8 109 24 109/69 (82) 94   


 


3/12/18 08:44 96.0 90 24 114/74 (87) 96   


 


3/12/18 07:30     98 Nasal Cannula 1.00 


 


3/12/18 00:00  79      


 


3/12/18 00:00 97.0 89 20 130/77 (94) 97   


 


3/11/18 23:00  94 29 114/69 (84) 94   


 


3/11/18 20:45     93   21


 


3/11/18 20:00  88      


 


3/11/18 19:25 98.1 88 26 147/78 (101) 92   


 


3/11/18 19:00  82 18  91   


 


3/11/18 16:00 98.2 82 17  91   


 


3/11/18 16:00  82      


 


3/11/18 15:13  86 30 126/64 (84) 93   














I/O      


 


 3/11/18 3/11/18 3/11/18 3/12/18 3/12/18 3/12/18





 07:00 15:00 23:00 07:00 15:00 23:00


 


Intake Total 100 ml     


 


Output Total 400 ml     


 


Balance -300 ml     


 


      


 


Intake Oral 100 ml     


 


Output Urine Total 400 ml     


 


# Voids  1 1  2 


 


# Bowel Movements 0 1   2 








Result Diagram:  


3/12/18 0545                                                                   

             3/12/18 0545





Objective Remarks


GENERAL: NAD, A&Ox3


HEAD: Normocephalic. 


NECK: Supple, trachea midline. No lymphadenopathy.


EYES: No scleral icterus. No injection or drainage. 


CARDIOVASCULAR: Regular rate and rhythm without murmurs, gallops, or rubs. 


RESPIRATORY: Breath sounds equal bilaterally. No accessory muscle use.


GASTROINTESTINAL: Abdomen soft, non-tender, nondistended. 


MUSCULOSKELETAL: No cyanosis, or edema. 


SKIN: Warm and dry.


NEURO:  No focal neurological deficitis.





A/P


Problem List:  


(1) Hypoxia


ICD Code:  R09.02 - Hypoxemia


(2) Acute respiratory failure


ICD Code:  J96.00 - Acute respiratory failure, unspecified whether with hypoxia 

or hypercapnia


(3) COPD (chronic obstructive pulmonary disease)


ICD Code:  J44.9 - COPD (chronic obstructive pulmonary disease)


Status:  Acute


(4) COPD exacerbation


ICD Code:  J44.1 - Chronic obstructive pulmonary disease with (acute) 

exacerbation


Status:  Acute


Assessment and Plan


86-year-old male admitted secondary to COPD exacerbation with acute respiratory 

failure and hypoxia.





Some improvement overnight thus far.  Labs reviewed.  Renal function improving.

  Electrolytes have remained stable.  Continue to monitor labs.  Labs ordered 

for further monitoring.





COPD exacerbation


Acute respiratory failure


Hypoxia


Improving slowly


Continue oxygen supplements as needed


Schedule duo nebs


When necessary albuterol


Azithromycin


Continue Systemic steroids


Follow for improvement in respiratory status


Follow for improvement in exertional tolerance next





Atrial fibrillation


Follow on telemetry


Continue baseline treatments





Chronic kidney disease


Monitor renal function


Avoid nephrotoxins





History of right lung cancer


History of right kidney cancer


History of right adrenal gland cancer


History of basal cell cancer of scalp


Abdominal aortic aneurysm


History of thoracic aortic aneurysm


Follows in outpatient


History of lung lobectomy would be contributory to patient's respiratory status





Hyperlipidemia


Continue present treatment


Follow as an outpatient





Hypertension


Continue baseline treatment


Follow blood pressures


Adjust treatments as needed





DVT prophylaxis


History of pulmonary embolism


Lovenox


Plan to renal adjusted if GFR drops below 30











Will Bunn MD Mar 12, 2018 14:32

## 2018-03-13 VITALS
SYSTOLIC BLOOD PRESSURE: 93 MMHG | HEART RATE: 80 BPM | RESPIRATION RATE: 20 BRPM | OXYGEN SATURATION: 94 % | DIASTOLIC BLOOD PRESSURE: 58 MMHG | TEMPERATURE: 96.4 F

## 2018-03-13 VITALS
SYSTOLIC BLOOD PRESSURE: 136 MMHG | HEART RATE: 84 BPM | OXYGEN SATURATION: 95 % | RESPIRATION RATE: 21 BRPM | DIASTOLIC BLOOD PRESSURE: 77 MMHG | TEMPERATURE: 96.8 F

## 2018-03-13 VITALS
TEMPERATURE: 97.6 F | DIASTOLIC BLOOD PRESSURE: 67 MMHG | SYSTOLIC BLOOD PRESSURE: 133 MMHG | RESPIRATION RATE: 20 BRPM | OXYGEN SATURATION: 96 % | HEART RATE: 88 BPM

## 2018-03-13 VITALS
OXYGEN SATURATION: 94 % | SYSTOLIC BLOOD PRESSURE: 121 MMHG | DIASTOLIC BLOOD PRESSURE: 77 MMHG | HEART RATE: 82 BPM | RESPIRATION RATE: 20 BRPM | TEMPERATURE: 97.6 F

## 2018-03-13 VITALS
HEART RATE: 81 BPM | TEMPERATURE: 97.1 F | SYSTOLIC BLOOD PRESSURE: 131 MMHG | RESPIRATION RATE: 20 BRPM | OXYGEN SATURATION: 90 % | DIASTOLIC BLOOD PRESSURE: 80 MMHG

## 2018-03-13 VITALS
TEMPERATURE: 98.6 F | OXYGEN SATURATION: 95 % | DIASTOLIC BLOOD PRESSURE: 71 MMHG | SYSTOLIC BLOOD PRESSURE: 116 MMHG | RESPIRATION RATE: 20 BRPM | HEART RATE: 90 BPM

## 2018-03-13 VITALS
TEMPERATURE: 98.4 F | DIASTOLIC BLOOD PRESSURE: 97 MMHG | RESPIRATION RATE: 18 BRPM | OXYGEN SATURATION: 92 % | SYSTOLIC BLOOD PRESSURE: 130 MMHG | HEART RATE: 68 BPM

## 2018-03-13 VITALS — OXYGEN SATURATION: 92 % | HEART RATE: 92 BPM

## 2018-03-13 VITALS — OXYGEN SATURATION: 95 %

## 2018-03-13 VITALS — HEART RATE: 84 BPM

## 2018-03-13 VITALS — OXYGEN SATURATION: 93 %

## 2018-03-13 RX ADMIN — Medication SCH ML: at 09:50

## 2018-03-13 RX ADMIN — Medication SCH ML: at 19:56

## 2018-03-13 RX ADMIN — IPRATROPIUM BROMIDE AND ALBUTEROL SULFATE SCH AMPULE: .5; 3 SOLUTION RESPIRATORY (INHALATION) at 20:22

## 2018-03-13 RX ADMIN — IPRATROPIUM BROMIDE AND ALBUTEROL SULFATE SCH AMPULE: .5; 3 SOLUTION RESPIRATORY (INHALATION) at 07:35

## 2018-03-13 RX ADMIN — DILTIAZEM HYDROCHLORIDE SCH MG: 180 CAPSULE, EXTENDED RELEASE ORAL at 09:49

## 2018-03-13 RX ADMIN — PANTOPRAZOLE SODIUM SCH MG: 20 TABLET, DELAYED RELEASE ORAL at 09:49

## 2018-03-13 RX ADMIN — OSELTAMIVIR PHOSPHATE SCH MG: 30 CAPSULE ORAL at 09:48

## 2018-03-13 RX ADMIN — IPRATROPIUM BROMIDE AND ALBUTEROL SULFATE SCH AMPULE: .5; 3 SOLUTION RESPIRATORY (INHALATION) at 14:18

## 2018-03-13 RX ADMIN — ENOXAPARIN SODIUM SCH MG: 30 INJECTION SUBCUTANEOUS at 19:56

## 2018-03-13 RX ADMIN — ASPIRIN SCH MG: 81 TABLET ORAL at 09:49

## 2018-03-13 RX ADMIN — LISINOPRIL SCH MG: 20 TABLET ORAL at 09:49

## 2018-03-13 RX ADMIN — OSELTAMIVIR PHOSPHATE SCH MG: 30 CAPSULE ORAL at 19:58

## 2018-03-13 RX ADMIN — STANDARDIZED SENNA CONCENTRATE AND DOCUSATE SODIUM SCH TAB: 8.6; 5 TABLET, FILM COATED ORAL at 19:58

## 2018-03-13 RX ADMIN — STANDARDIZED SENNA CONCENTRATE AND DOCUSATE SODIUM SCH TAB: 8.6; 5 TABLET, FILM COATED ORAL at 08:01

## 2018-03-13 NOTE — HHI.PR
Subjective


Remarks


Respiratory status improving.  Patient's physical ability to ambulate is 

improving slowly he is not yet stable for discharge to home.  She lives at home 

alone with his wife.  He may be stable for discharge home in 1-2 days.





Objective





Vital Signs








  Date Time  Temp Pulse Resp B/P (MAP) Pulse Ox O2 Delivery O2 Flow Rate FiO2


 


3/13/18 12:00  92   92   


 


3/13/18 09:49     95   21


 


3/13/18 08:00  84      


 


3/13/18 08:00     95 Nasal Cannula 2.00 21


 


3/13/18 07:15 96.8 84 21 136/77 (96) 95   


 


3/13/18 04:00 97.1 81 20 131/80 (97) 90   


 


3/13/18 00:00 96.2 86 18 155/97 (116) 92   


 


3/12/18 20:20     95 Nasal Cannula 2.00 


 


3/12/18 20:05  76      


 


3/12/18 20:00 96.6 95 18 146/94 (111) 90   


 


3/12/18 20:00     90 Nasal Cannula 2.00 


 


3/12/18 17:15 97.3 90 24 116/67 (83) 93   














I/O      


 


 3/12/18 3/12/18 3/12/18 3/13/18 3/13/18 3/13/18





 07:00 15:00 23:00 07:00 15:00 23:00


 


Intake Total    660 ml  


 


Balance    660 ml  


 


      


 


Intake Oral    660 ml  


 


# Voids  2  5  


 


# Bowel Movements  2  2  








Result Diagram:  


3/12/18 0545                                                                   

             3/12/18 0545





Objective Remarks


GENERAL: NAD, A&Ox3


HEAD: Normocephalic. 


NECK: Supple, trachea midline. No lymphadenopathy.


EYES: No scleral icterus. No injection or drainage. 


CARDIOVASCULAR: Regular rate and rhythm without murmurs, gallops, or rubs. 


RESPIRATORY: Breath sounds equal bilaterally. No accessory muscle use.


GASTROINTESTINAL: Abdomen soft, non-tender, nondistended. 


MUSCULOSKELETAL: No cyanosis, or edema. 


SKIN: Warm and dry.


NEURO:  No focal neurological deficitis.





A/P


Problem List:  


(1) Hypoxia


ICD Code:  R09.02 - Hypoxemia


(2) Acute respiratory failure


ICD Code:  J96.00 - Acute respiratory failure, unspecified whether with hypoxia 

or hypercapnia


(3) COPD (chronic obstructive pulmonary disease)


ICD Code:  J44.9 - COPD (chronic obstructive pulmonary disease)


Status:  Acute


(4) COPD exacerbation


ICD Code:  J44.1 - Chronic obstructive pulmonary disease with (acute) 

exacerbation


Status:  Acute


Assessment and Plan


86-year-old male admitted secondary to COPD exacerbation with acute respiratory 

failure and hypoxia.





Restoril status improving slowly.  Continue physical therapy.  Possible 

discharge in 1-2 days if physical abilities improve.





COPD exacerbation


Acute respiratory failure


Hypoxia


Improving slowly


Continue oxygen supplements as needed


Schedule duo nebs


When necessary albuterol


Azithromycin


Continue Systemic steroids


Follow for improvement in respiratory status


Follow for improvement in exertional tolerance next





Atrial fibrillation


Follow on telemetry


Continue baseline treatments





Chronic kidney disease


Monitor renal function


Avoid nephrotoxins





History of right lung cancer


History of right kidney cancer


History of right adrenal gland cancer


History of basal cell cancer of scalp


Abdominal aortic aneurysm


History of thoracic aortic aneurysm


Follows in outpatient


History of lung lobectomy would be contributory to patient's respiratory status





Hyperlipidemia


Continue present treatment


Follow as an outpatient





Hypertension


Continue baseline treatment


Follow blood pressures


Adjust treatments as needed





DVT prophylaxis


History of pulmonary embolism


Lovenox


Plan to renal adjusted if GFR drops below 30











Will Bunn MD Mar 13, 2018 15:09

## 2018-03-14 VITALS
HEART RATE: 86 BPM | DIASTOLIC BLOOD PRESSURE: 67 MMHG | RESPIRATION RATE: 20 BRPM | OXYGEN SATURATION: 93 % | TEMPERATURE: 97.6 F | SYSTOLIC BLOOD PRESSURE: 116 MMHG

## 2018-03-14 VITALS
HEART RATE: 76 BPM | DIASTOLIC BLOOD PRESSURE: 66 MMHG | SYSTOLIC BLOOD PRESSURE: 124 MMHG | RESPIRATION RATE: 16 BRPM | TEMPERATURE: 96.6 F | OXYGEN SATURATION: 96 %

## 2018-03-14 VITALS
HEART RATE: 74 BPM | RESPIRATION RATE: 18 BRPM | TEMPERATURE: 97.7 F | DIASTOLIC BLOOD PRESSURE: 76 MMHG | SYSTOLIC BLOOD PRESSURE: 106 MMHG | OXYGEN SATURATION: 91 %

## 2018-03-14 VITALS
SYSTOLIC BLOOD PRESSURE: 134 MMHG | OXYGEN SATURATION: 95 % | TEMPERATURE: 98.8 F | DIASTOLIC BLOOD PRESSURE: 67 MMHG | RESPIRATION RATE: 20 BRPM | HEART RATE: 84 BPM

## 2018-03-14 VITALS
SYSTOLIC BLOOD PRESSURE: 112 MMHG | DIASTOLIC BLOOD PRESSURE: 68 MMHG | TEMPERATURE: 97.8 F | RESPIRATION RATE: 20 BRPM | HEART RATE: 86 BPM | OXYGEN SATURATION: 93 %

## 2018-03-14 VITALS — OXYGEN SATURATION: 95 %

## 2018-03-14 VITALS
RESPIRATION RATE: 18 BRPM | SYSTOLIC BLOOD PRESSURE: 123 MMHG | TEMPERATURE: 96.6 F | DIASTOLIC BLOOD PRESSURE: 72 MMHG | HEART RATE: 89 BPM | OXYGEN SATURATION: 93 %

## 2018-03-14 VITALS — OXYGEN SATURATION: 93 %

## 2018-03-14 RX ADMIN — IPRATROPIUM BROMIDE AND ALBUTEROL SULFATE SCH AMPULE: .5; 3 SOLUTION RESPIRATORY (INHALATION) at 14:18

## 2018-03-14 RX ADMIN — DILTIAZEM HYDROCHLORIDE SCH MG: 180 CAPSULE, EXTENDED RELEASE ORAL at 09:23

## 2018-03-14 RX ADMIN — Medication SCH ML: at 20:32

## 2018-03-14 RX ADMIN — PANTOPRAZOLE SODIUM SCH MG: 20 TABLET, DELAYED RELEASE ORAL at 09:23

## 2018-03-14 RX ADMIN — Medication SCH ML: at 09:00

## 2018-03-14 RX ADMIN — LISINOPRIL SCH MG: 20 TABLET ORAL at 09:23

## 2018-03-14 RX ADMIN — STANDARDIZED SENNA CONCENTRATE AND DOCUSATE SODIUM SCH TAB: 8.6; 5 TABLET, FILM COATED ORAL at 09:22

## 2018-03-14 RX ADMIN — ASPIRIN SCH MG: 81 TABLET ORAL at 09:23

## 2018-03-14 RX ADMIN — ENOXAPARIN SODIUM SCH MG: 30 INJECTION SUBCUTANEOUS at 20:31

## 2018-03-14 RX ADMIN — IPRATROPIUM BROMIDE AND ALBUTEROL SULFATE SCH AMPULE: .5; 3 SOLUTION RESPIRATORY (INHALATION) at 07:42

## 2018-03-14 RX ADMIN — STANDARDIZED SENNA CONCENTRATE AND DOCUSATE SODIUM SCH TAB: 8.6; 5 TABLET, FILM COATED ORAL at 20:23

## 2018-03-14 RX ADMIN — OSELTAMIVIR PHOSPHATE SCH MG: 30 CAPSULE ORAL at 09:22

## 2018-03-14 RX ADMIN — IPRATROPIUM BROMIDE AND ALBUTEROL SULFATE SCH AMPULE: .5; 3 SOLUTION RESPIRATORY (INHALATION) at 19:44

## 2018-03-14 RX ADMIN — OSELTAMIVIR PHOSPHATE SCH MG: 30 CAPSULE ORAL at 20:31

## 2018-03-14 NOTE — HHI.PR
Subjective


Remarks


Risk for status continues to improve.  Patient's functionality continues to be 

a problem.  Patient had a fall last night while trying to use the bathroom.  No 

significant or focal trauma.





Objective





Vital Signs








  Date Time  Temp Pulse Resp B/P (MAP) Pulse Ox O2 Delivery O2 Flow Rate FiO2


 


3/14/18 14:20     95   21


 


3/14/18 11:50 97.8 86 20 112/68 (83) 93   


 


3/14/18 07:50 97.6 86 20 116/67 (83) 93   


 


3/14/18 07:44     93   21


 


3/14/18 07:00     96 Room Air  


 


3/14/18 04:00 97.7 74 18 106/76 (86) 91   


 


3/14/18 00:07 96.6 89 18 123/72 (89) 93   


 


3/13/18 22:30 97.6 88 20 133/67 (89) 96   


 


3/13/18 21:30 98.6 90 20 116/71 (86) 95   


 


3/13/18 20:20     93 Nasal Cannula 2.00 


 


3/13/18 20:00     95 Room Air  


 


3/13/18 20:00 97.6 82 20 121/77 (92) 94   


 


3/13/18 17:10 96.4 80 20 93/58 (70) 94   














I/O      


 


 3/13/18 3/13/18 3/13/18 3/14/18 3/14/18 3/14/18





 07:00 15:00 23:00 07:00 15:00 23:00


 


Intake Total 660 ml  480 ml 420 ml  


 


Balance 660 ml  480 ml 420 ml  


 


      


 


Intake Oral 660 ml  480 ml 420 ml  


 


# Voids 5  2 3  


 


# Bowel Movements 2   0  








Result Diagram:  


3/12/18 0545                                                                   

             3/12/18 0545





Objective Remarks


GENERAL: NAD, A&Ox3


HEAD: Normocephalic. 


NECK: Supple, trachea midline. No lymphadenopathy.


EYES: No scleral icterus. No injection or drainage. 


CARDIOVASCULAR: Regular rate and rhythm without murmurs, gallops, or rubs. 


RESPIRATORY: Breath sounds equal bilaterally. No accessory muscle use.


GASTROINTESTINAL: Abdomen soft, non-tender, nondistended. 


MUSCULOSKELETAL: No cyanosis, or edema. 


SKIN: Warm and dry.


NEURO:  No focal neurological deficitis.





A/P


Problem List:  


(1) Hypoxia


ICD Code:  R09.02 - Hypoxemia


(2) Acute respiratory failure


ICD Code:  J96.00 - Acute respiratory failure, unspecified whether with hypoxia 

or hypercapnia


(3) COPD (chronic obstructive pulmonary disease)


ICD Code:  J44.9 - COPD (chronic obstructive pulmonary disease)


Status:  Acute


(4) COPD exacerbation


ICD Code:  J44.1 - Chronic obstructive pulmonary disease with (acute) 

exacerbation


Status:  Acute


Assessment and Plan


86-year-old male admitted secondary to COPD exacerbation with acute respiratory 

failure and hypoxia.





Respiratory status continues to improve.  Physical ability is poor.  Continue 

physical therapy.  Discharge planning for skilled nursing facility versus 

inpatient rehabilitation.





COPD exacerbation


Acute respiratory failure


Hypoxia


Improving slowly


Continue oxygen supplements as needed


Schedule duo nebs


When necessary albuterol


Azithromycin


Continue Systemic steroids


Follow for improvement in respiratory status


Follow for improvement in exertional tolerance next





Atrial fibrillation


Follow on telemetry


Continue baseline treatments





Chronic kidney disease


Monitor renal function


Avoid nephrotoxins





History of right lung cancer


History of right kidney cancer


History of right adrenal gland cancer


History of basal cell cancer of scalp


Abdominal aortic aneurysm


History of thoracic aortic aneurysm


Follows in outpatient


History of lung lobectomy would be contributory to patient's respiratory status





Hyperlipidemia


Continue present treatment


Follow as an outpatient





Hypertension


Continue baseline treatment


Follow blood pressures


Adjust treatments as needed





DVT prophylaxis


History of pulmonary embolism


Lovenox


Plan to renal adjusted if GFR drops below 30











Will Bunn MD Mar 14, 2018 14:42

## 2018-03-14 NOTE — PQ
Physician Query Response Document

 

 

PATIENT:               ELLIOTT BRANDON

ACCT #:                  [id number]

MRN:                       [id number]

:                       [id number]

ADMIT DATE:       3/10/2018 6:21 PM

DISCH DATE:

RESPONDING

PROVIDER #:        [email or web address]

 

 

QUERY TEXT:

 

CDS Clarification

 

Influenza A, present on admission, in the setting in a patient with acute respiratory failure with hy
poxia and copd exacerbation, now improving requirng Tamiflu, bipap, and solumedrol.

Other explanation of clinical findings.

Unable to determine (no explanation for clinical findings).

 

 

 

The patient's Clinical Indicators include:

The medical record reflects the following clinical findings, treatment, and risk factors.

 

* Clinical Indicators: Sputum culture influenza A +

* Risk Factors: 85 y/o male, s/p lung cancer with lobectomy; copd, flu exposure

* Treatment: Tamiflu, Bipap >>>> NC, solumedrol, NS bolus 500 ml

 

Please clarify and document your clinical opinion in the progress notes and discharge summary includi
ng the definitive and/or presumptive diagnosis (suspected or probable), related to the above clinical
 findings. Please include clinical findings supporting your diagnosis.

Thank you, Roxanne Phillips

 

CDS: Roxanne Phillips

Contact Number:   [phone]

 

 

 

 

Query created by: Roxanne Phillips on 3/13/2018 8:47 AM

 

RESPONSE TEXT:

 

Provider disagreed with this CDI query.

No confirmatory test found.

 

 

 

 

 

 

 

Electronically signed by:  Yash Bunn 3/14/2018 9:20 AM

## 2018-03-15 VITALS
SYSTOLIC BLOOD PRESSURE: 132 MMHG | HEART RATE: 74 BPM | DIASTOLIC BLOOD PRESSURE: 72 MMHG | OXYGEN SATURATION: 92 % | RESPIRATION RATE: 20 BRPM | TEMPERATURE: 96.8 F

## 2018-03-15 VITALS
SYSTOLIC BLOOD PRESSURE: 118 MMHG | DIASTOLIC BLOOD PRESSURE: 67 MMHG | TEMPERATURE: 97.8 F | HEART RATE: 76 BPM | RESPIRATION RATE: 18 BRPM | OXYGEN SATURATION: 95 %

## 2018-03-15 VITALS
OXYGEN SATURATION: 95 % | RESPIRATION RATE: 20 BRPM | TEMPERATURE: 97.7 F | SYSTOLIC BLOOD PRESSURE: 99 MMHG | DIASTOLIC BLOOD PRESSURE: 57 MMHG | HEART RATE: 75 BPM

## 2018-03-15 VITALS
DIASTOLIC BLOOD PRESSURE: 67 MMHG | OXYGEN SATURATION: 95 % | TEMPERATURE: 97.8 F | SYSTOLIC BLOOD PRESSURE: 118 MMHG | HEART RATE: 76 BPM | RESPIRATION RATE: 18 BRPM

## 2018-03-15 VITALS — OXYGEN SATURATION: 95 %

## 2018-03-15 RX ADMIN — Medication SCH ML: at 09:01

## 2018-03-15 RX ADMIN — OSELTAMIVIR PHOSPHATE SCH MG: 30 CAPSULE ORAL at 09:05

## 2018-03-15 RX ADMIN — STANDARDIZED SENNA CONCENTRATE AND DOCUSATE SODIUM SCH TAB: 8.6; 5 TABLET, FILM COATED ORAL at 09:03

## 2018-03-15 RX ADMIN — ALBUTEROL SULFATE PRN MG: 2.5 SOLUTION RESPIRATORY (INHALATION) at 07:28

## 2018-03-15 RX ADMIN — ASPIRIN SCH MG: 81 TABLET ORAL at 09:02

## 2018-03-15 RX ADMIN — PANTOPRAZOLE SODIUM SCH MG: 20 TABLET, DELAYED RELEASE ORAL at 09:02

## 2018-03-15 RX ADMIN — ALBUTEROL SULFATE PRN MG: 2.5 SOLUTION RESPIRATORY (INHALATION) at 14:32

## 2018-03-15 RX ADMIN — DILTIAZEM HYDROCHLORIDE SCH MG: 180 CAPSULE, EXTENDED RELEASE ORAL at 09:00

## 2018-03-15 RX ADMIN — LISINOPRIL SCH MG: 20 TABLET ORAL at 12:27

## 2018-03-15 NOTE — HHI.DS
__________________________________________________





Discharge Summary


Admission Date


Mar 10, 2018 at 18:21


Discharge Date:  Mar 15, 2018


Admitting Diagnosis





COPD Exacerbation





(1) Hypoxia


ICD Code:  R09.02 - Hypoxemia


Diagnosis:  Principal





(2) Acute respiratory failure


ICD Code:  J96.00 - Acute respiratory failure, unspecified whether with hypoxia 

or hypercapnia


Diagnosis:  Principal





(3) COPD exacerbation


ICD Code:  J44.1 - Chronic obstructive pulmonary disease with (acute) 

exacerbation


Diagnosis:  Principal


Status:  Acute


(4) COPD (chronic obstructive pulmonary disease)


ICD Code:  J44.9 - COPD (chronic obstructive pulmonary disease)


Diagnosis:  Principal


Status:  Acute


Procedures


None


Brief History - From Admission


Mr. Parra is an 86 year old male.  He came to the hospital secondary to 

exacerbation of his COPD.  He's been dealing with COPD exacerbation for most of 

the preceding week.  He visited his pulmonologist 4 days ago and was prescribed 

steroids and antibiotics.  This helped a little bit but his breathing has 

worsened again today so he came for help.  He has a history of hypercarbia.  In 

the ER he has been started on BiPAP and this has resulted in a decrease in his 

symptoms of respiratory distress.  He has not had any measurable fevers yet but 

she may have been exposed to influenza as his wife reports that she had 

influenza earlier this week.  This patient has received a flu vaccine this 

year.  No other complaints tonight.  No chest pain.


CBC/BMP:  


3/12/18 0545                                                                   

             3/12/18 0545





Hospital Course


Mr. Parra is an 86 year old male.  He came to the hospital with COPD 

exacerbation.  Outpatient management of this has been difficult, he reverted 

tried antibiotics with no benefit.  Closure of influenza with his wife having 

food earlier this week.  He is found to be flu positive, this is likely the 

etiology for his respiratory distress which is compounding his underlying COPD.

  COPD exacerbation was treated with steroids, inhaler treatments, and oxygen 

support.  Patient is gradually improving here.  His breathing is returning to 

baseline slowly.  At this point he has a physical deficit which is taking 

longer to improve.  He'll benefit from further physical therapy and inpatient 

rehabilitation setting.  Medically cleared and stable for discharge to the 

setting.  He has 2 more days of Tamiflu left to complete a 5 day treatment, it 

is unlikely she is contagious any longer at this point however given his history

, however due to the headache time of onset a complete Tamiflu therapy is 

provided.  Clear for discharge today.


Pt Condition on Discharge:  Stable


Discharge Disposition:  Rehab Inpatient


Discharge Time:  > 30 minutes


Discharge Instructions


DIET: Follow Instructions for:  As Tolerated, No Restrictions


Activities you can perform:  Regular-No Restrictions


Other Activity Instructions:  


Ambulate with assistance/therapy for now


Follow up Referrals:  


PCP Follow-up - 2 Weeks





New Medications:  


Prednisone (Prednisone) 10 Mg Tab


10 MG PO AS DIRECTED for Inflammation, #17 TAB 0 Refills


2 tab by mouth daily, day 1 to 5


 1 tab by mouth daily, day 6 to 10


 1/2 tab by mough daily, day 11 to 14


 Then stop


Alprazolam (Xanax) 0.25 Mg Tab


0.25 MG PO Q6H PRN for Anxiety, #20 TAB





Oseltamivir (Tamiflu) 30 Mg Cap


30 MG PO Q12HR for Influenza, #3 CAP





[Albuterol Neb] () 2.5 MG/3 ML NEBU


2.5 MG INH Q2HR NEB PRN for SHORTNESS OF BREATH, #15 INH





 


Continued Medications:  


Aspirin DR (Aspirin EC) 81 Mg Tabdr


81 MG PO DAILY, TAB 0 Refills





Diltiazem CD 24 HR (Diltiazem CD 24 HR) 360 Mg Capcr


360 MG PO DAILY, #30 CAP 0 Refills





Ipratropium-Albuterol Neb (Duoneb) 0.5-2.5 Mg/3 Ml Neb


1 NEBULE INH Q4HR NEB PRN for SHORTNESS OF BREATH, #120 NEBULE 0 Refills





Lisinopril (Lisinopril) 20 Mg Tab


20 MG PO DAILY, #30 TAB 0 Refills





Omeprazole (Omeprazole) 20 Mg Tab


20 MG PO DAILY, #30 TAB 0 Refills





 


Discontinued Medications:  


Doxycycline Hyclate (Doxycycline Hyclate) 100 Mg Cap


100 MG PO BID for Infection, CAP 0 Refills

















Will Bunn MD Mar 15, 2018 10:56

## 2021-08-17 NOTE — HHI.PR
Subjective


Remarks


Patient reports is feeling better today.  Breathing has improved.  We had a 

long discussion today about all the findings so far including risk and benefits 

of anticoagulation.  For now he is opting for no anticoagulation.  He will 

remain on aspirin.





Objective


Vitals





 Vital Signs








  Date Time  Temp Pulse Resp B/P Pulse Ox O2 Delivery O2 Flow Rate FiO2


 


8/17/17 09:00  72      


 


8/17/17 08:13     96   


 


8/17/17 08:00 97.3 67 16 169/86 98   


 


8/17/17 07:00      Nasal Cannula 2.00 21


 


8/17/17 04:00 96.2 66 18 180/85 96   


 


8/17/17 00:00 97.7 72 18 182/78 96   


 


8/16/17 20:12     95   21


 


8/16/17 20:00      Nasal Cannula 2.00 


 


8/16/17 20:00  75      


 


8/16/17 20:00 98.9 70 18 180/79 98   


 


8/16/17 17:28  70  160/70    


 


8/16/17 16:45 98.4 74 16 180/80 97   


 


8/16/17 14:24 97.1 91 18 144/67 98   








 I/O








 8/16/17 8/16/17 8/16/17 8/17/17 8/17/17 8/17/17





 07:00 15:00 23:00 07:00 15:00 23:00


 


Intake Total 480 ml 400 ml 1498 ml 840 ml  


 


Output Total  600 ml  800 ml  


 


Balance 480 ml -200 ml 1498 ml 40 ml  


 


      


 


Intake Oral 480 ml 400 ml 1300 ml 840 ml  


 


IV Total   198 ml   


 


Output Urine Total  600 ml  800 ml  


 


# Voids 3  4   


 


# Bowel Movements 0   0  








Result Diagram:  


8/17/17 0525                                                                   

             8/17/17 0525





Imaging





Last Impressions








Lower Extremity Ultrasound 8/16/17 0000 Signed





Impressions: 





 Service Date/Time:  Wednesday, August 16, 2017 14:37 - CONCLUSION: Negative 

exam 





 with no evidence of thrombosis.     Jesse Spring MD 


 


Lung Scan- Nuclear Medicine 8/15/17 1755 Signed





Impressions: 





 Service Date/Time:  Tuesday, August 15, 2017 19:41 - CONCLUSION:  Intermediate 





 probability for pulmonary embolus with 2 subsegmental perfusion deficits of 

the 





 right lung noted.     Javier Ojeda MD 


 


Chest X-Ray 8/15/17 1755 Signed





Impressions: 





 Service Date/Time:  Tuesday, August 15, 2017 18:40 - CONCLUSION:  Chronic and 





 surgical changes as above. No acute abnormality demonstrated.     Javier Ojeda MD 








Objective Remarks


GENERAL: Elderly male.  Dyspneic as baseline.


CARDIOVASCULAR: Normal rate and regular rhythm without murmurs, gallops, or 

rubs. 


RESPIRATORY: Good respiratory efforts. Diminished breath sounds throughout.  No 

wheezing noted today.


GASTROINTESTINAL: Abdomen soft, non-tender, non-distended. Normal active bowel 

sounds


MUSCULOSKELETAL: Extremities without cyanosis, or edema.


NEURO:  Alert & Oriented x4 to person, place, time, situation.  Moves all ext x4


PSYCH: Appropriate mood and affect.





A/P


Problem List:  


(1) COPD exacerbation


ICD Code:  J44.1


Status:  Acute


(2) Dyspnea


ICD Code:  R06.00


Status:  Acute


(3) Chronic kidney disease


ICD Code:  N18.9


Status:  Acute


(4) Hypertension


ICD Code:  I10


Status:  Acute


Assessment and Plan


85-year-old male with:





COPD exacerbation/? PE: VQ scan reports intermediate probability of PE.  

However the patient does have a history of lung cancer and lobectomy on the 

right side.  I discussed the history and the VQ scan findings with the 

radiologist.  He reports it is probably low probability of PE.  Patient was 

started on heparin on admission.  We had a long discussion regarding risk and 

benefit of anticoagulation.  He has competing needs.  He has paroxysmal atrial 

fibrillation and reports that after his nephrectomy, he did have a blood clot 

on the right side of his lung where the cancer was resected.  However he 

reports he was put on Coumadin once and had hemoptysis, blood from his urine 

and stool.  Therefore this was discontinued.  I discussed risk of stroke and 

worsening PE if in fact he does have a PE.  I also discussed the risk of 

hemorrhage with anticoagulation.  At this point the patient opted to not use 

anticoagulant and continue treatment for COPD.  He will follow-up with his 

cardiologist and pulmonologist outpatient.


-Discontinue heparin drip


Transition to oral prednisone


 Continue duo nebs, breathing treatments.  Add Symbicort.





Atrial fibrillation: Continue Cardizem.  Aspirin.





Hypertension: On diltiazem as above.  Continue lisinopril.





Chronic kidney disease stage III: Patient has 1 functioning kidney.  History of 

nephrectomy for renal cancer.


Avoid nephrotoxins.  Follow renal functions.





GI prophylaxis: PPI.  Stool softener PRN constipation. 





DVT PPx: On heparin


Discharge Planning


Possible discharge tomorrow morning if he continues to improve.








Hayley Gupta MD Aug 17, 2017 11:03
no